# Patient Record
Sex: FEMALE | Race: WHITE | Employment: OTHER | URBAN - METROPOLITAN AREA
[De-identification: names, ages, dates, MRNs, and addresses within clinical notes are randomized per-mention and may not be internally consistent; named-entity substitution may affect disease eponyms.]

---

## 2017-03-13 PROBLEM — I48.91 NEW ONSET ATRIAL FIBRILLATION (HCC): Status: ACTIVE | Noted: 2017-03-13

## 2017-03-13 PROBLEM — I50.43 ACUTE ON CHRONIC COMBINED SYSTOLIC AND DIASTOLIC CHF (CONGESTIVE HEART FAILURE) (HCC): Status: ACTIVE | Noted: 2017-03-13

## 2018-01-01 ENCOUNTER — HOSPITAL ENCOUNTER (OUTPATIENT)
Age: 83
Discharge: HOME | End: 2018-09-16
Payer: MEDICARE

## 2018-01-01 ENCOUNTER — HOSPITAL ENCOUNTER (OUTPATIENT)
Age: 83
Discharge: HOME | End: 2018-12-05
Payer: MEDICARE

## 2018-01-01 ENCOUNTER — TELEPHONE (OUTPATIENT)
Dept: CARDIOLOGY CLINIC | Age: 83
End: 2018-01-01

## 2018-01-01 DIAGNOSIS — E03.8 OTHER SPECIFIED HYPOTHYROIDISM: ICD-10-CM

## 2018-01-01 DIAGNOSIS — I10 BENIGN ESSENTIAL HTN: ICD-10-CM

## 2018-01-01 DIAGNOSIS — E78.5 HYPERLIPIDEMIA, UNSPECIFIED HYPERLIPIDEMIA TYPE: ICD-10-CM

## 2018-01-01 DIAGNOSIS — I48.20 CHRONIC ATRIAL FIBRILLATION (HCC): ICD-10-CM

## 2018-01-01 DIAGNOSIS — E11.9 TYPE 2 DIABETES MELLITUS WITHOUT COMPLICATION, WITHOUT LONG-TERM CURRENT USE OF INSULIN (HCC): ICD-10-CM

## 2018-01-01 LAB
ALBUMIN SERPL-MCNC: 3.6 G/DL (ref 3.5–5.2)
ALBUMIN SERPL-MCNC: 3.8 G/DL (ref 3.5–5.2)
ALP BLD-CCNC: 91 U/L (ref 35–104)
ALP BLD-CCNC: 96 U/L (ref 35–104)
ALT SERPL-CCNC: 21 U/L (ref 0–32)
ALT SERPL-CCNC: 7 U/L (ref 0–32)
ANION GAP SERPL CALCULATED.3IONS-SCNC: 14 MMOL/L (ref 7–16)
ANION GAP SERPL CALCULATED.3IONS-SCNC: 20 MMOL/L (ref 7–16)
AST SERPL-CCNC: 16 U/L (ref 0–31)
AST SERPL-CCNC: 20 U/L (ref 0–31)
BASOPHILS ABSOLUTE: 0.1 E9/L (ref 0–0.2)
BASOPHILS RELATIVE PERCENT: 0.9 % (ref 0–2)
BILIRUB SERPL-MCNC: 0.6 MG/DL (ref 0–1.2)
BILIRUB SERPL-MCNC: 0.6 MG/DL (ref 0–1.2)
BUN BLDV-MCNC: 30 MG/DL (ref 8–23)
BUN BLDV-MCNC: 36 MG/DL (ref 8–23)
CALCIUM SERPL-MCNC: 9.6 MG/DL (ref 8.6–10.2)
CALCIUM SERPL-MCNC: 9.9 MG/DL (ref 8.6–10.2)
CHLORIDE BLD-SCNC: 101 MMOL/L (ref 98–107)
CHLORIDE BLD-SCNC: 101 MMOL/L (ref 98–107)
CHOLESTEROL, TOTAL: 209 MG/DL (ref 0–199)
CO2: 19 MMOL/L (ref 22–29)
CO2: 20 MMOL/L (ref 22–29)
CREAT SERPL-MCNC: 1.5 MG/DL (ref 0.5–1)
CREAT SERPL-MCNC: 1.5 MG/DL (ref 0.5–1)
EOSINOPHILS ABSOLUTE: 0.22 E9/L (ref 0.05–0.5)
EOSINOPHILS RELATIVE PERCENT: 2.1 % (ref 0–6)
GFR AFRICAN AMERICAN: 39
GFR AFRICAN AMERICAN: 39
GFR NON-AFRICAN AMERICAN: 32 ML/MIN/1.73
GFR NON-AFRICAN AMERICAN: 32 ML/MIN/1.73
GLUCOSE BLD-MCNC: 226 MG/DL (ref 74–109)
GLUCOSE BLD-MCNC: 251 MG/DL (ref 74–99)
HBA1C MFR BLD: 7.4 % (ref 4–5.6)
HBA1C MFR BLD: 8.5 % (ref 4–5.6)
HCT VFR BLD CALC: 34.3 % (ref 34–48)
HCT VFR BLD CALC: 36.2 % (ref 34–48)
HDLC SERPL-MCNC: 87 MG/DL
HEMOGLOBIN: 11.1 G/DL (ref 11.5–15.5)
HEMOGLOBIN: 11.5 G/DL (ref 11.5–15.5)
IMMATURE GRANULOCYTES #: 0.06 E9/L
IMMATURE GRANULOCYTES %: 0.6 % (ref 0–5)
LDL CHOLESTEROL CALCULATED: 95 MG/DL (ref 0–99)
LYMPHOCYTES ABSOLUTE: 1.36 E9/L (ref 1.5–4)
LYMPHOCYTES RELATIVE PERCENT: 12.9 % (ref 20–42)
MCH RBC QN AUTO: 29.6 PG (ref 26–35)
MCH RBC QN AUTO: 30.9 PG (ref 26–35)
MCHC RBC AUTO-ENTMCNC: 31.8 % (ref 32–34.5)
MCHC RBC AUTO-ENTMCNC: 32.4 % (ref 32–34.5)
MCV RBC AUTO: 91.5 FL (ref 80–99.9)
MCV RBC AUTO: 97.3 FL (ref 80–99.9)
MONOCYTES ABSOLUTE: 0.78 E9/L (ref 0.1–0.95)
MONOCYTES RELATIVE PERCENT: 7.4 % (ref 2–12)
NEUTROPHILS ABSOLUTE: 8.05 E9/L (ref 1.8–7.3)
NEUTROPHILS RELATIVE PERCENT: 76.1 % (ref 43–80)
PDW BLD-RTO: 14.5 FL (ref 11.5–15)
PDW BLD-RTO: 16 FL (ref 11.5–15)
PLATELET # BLD: 342 E9/L (ref 130–450)
PLATELET # BLD: 375 E9/L (ref 130–450)
PMV BLD AUTO: 10.6 FL (ref 7–12)
PMV BLD AUTO: 12.1 FL (ref 7–12)
POTASSIUM SERPL-SCNC: 4.2 MMOL/L (ref 3.5–5)
POTASSIUM SERPL-SCNC: 4.6 MMOL/L (ref 3.5–5)
RBC # BLD: 3.72 E12/L (ref 3.5–5.5)
RBC # BLD: 3.75 E12/L (ref 3.5–5.5)
SODIUM BLD-SCNC: 135 MMOL/L (ref 132–146)
SODIUM BLD-SCNC: 140 MMOL/L (ref 132–146)
T3 TOTAL: 55.74 NG/DL (ref 80–200)
T3 UPTAKE PERCENT: 34.2 % (ref 22.5–37)
T4 TOTAL: 6.7 MCG/DL (ref 4.5–11.7)
TOTAL PROTEIN: 6.7 G/DL (ref 6.4–8.3)
TOTAL PROTEIN: 7.1 G/DL (ref 6.4–8.3)
TRIGL SERPL-MCNC: 135 MG/DL (ref 0–149)
TSH SERPL DL<=0.05 MIU/L-ACNC: 123.7 UIU/ML (ref 0.27–4.2)
TSH SERPL DL<=0.05 MIU/L-ACNC: 6.31 UIU/ML (ref 0.27–4.2)
VLDLC SERPL CALC-MCNC: 27 MG/DL
WBC # BLD: 10.6 E9/L (ref 4.5–11.5)
WBC # BLD: 9.8 E9/L (ref 4.5–11.5)

## 2018-01-01 PROCEDURE — 84479 ASSAY OF THYROID (T3 OR T4): CPT

## 2018-01-01 PROCEDURE — 84443 ASSAY THYROID STIM HORMONE: CPT

## 2018-01-01 PROCEDURE — 85025 COMPLETE CBC W/AUTO DIFF WBC: CPT

## 2018-01-01 PROCEDURE — 80053 COMPREHEN METABOLIC PANEL: CPT

## 2018-01-01 PROCEDURE — 80061 LIPID PANEL: CPT

## 2018-01-01 PROCEDURE — 83036 HEMOGLOBIN GLYCOSYLATED A1C: CPT

## 2018-01-01 PROCEDURE — 84480 ASSAY TRIIODOTHYRONINE (T3): CPT

## 2018-01-01 PROCEDURE — 84436 ASSAY OF TOTAL THYROXINE: CPT

## 2018-01-01 PROCEDURE — 85027 COMPLETE CBC AUTOMATED: CPT

## 2018-03-02 PROBLEM — S72.141A CLOSED INTERTROCHANTERIC FRACTURE OF RIGHT FEMUR (HCC): Status: ACTIVE | Noted: 2018-03-02

## 2018-03-02 PROBLEM — S72.001A CLOSED RIGHT HIP FRACTURE, INITIAL ENCOUNTER (HCC): Status: ACTIVE | Noted: 2018-03-02

## 2018-03-03 PROBLEM — S72.001A CLOSED RIGHT HIP FRACTURE, INITIAL ENCOUNTER (HCC): Status: RESOLVED | Noted: 2018-03-02 | Resolved: 2018-03-03

## 2018-03-05 PROBLEM — I25.10 CAD (CORONARY ARTERY DISEASE): Status: ACTIVE | Noted: 2018-03-05

## 2018-03-06 PROBLEM — S72.141P CLOSED DISP INTERTROCHANTERIC FRACTURE OF RIGHT FEMUR WITH MALUNION: Status: ACTIVE | Noted: 2018-03-06

## 2018-03-06 PROBLEM — S72.141A CLOSED INTERTROCHANTERIC FRACTURE OF RIGHT FEMUR (HCC): Status: RESOLVED | Noted: 2018-03-02 | Resolved: 2018-03-06

## 2018-05-03 ENCOUNTER — HOSPITAL ENCOUNTER (INPATIENT)
Age: 83
LOS: 1 days | Discharge: HOME HEALTH | DRG: 291 | End: 2018-05-04
Attending: EMERGENCY MEDICINE | Admitting: FAMILY MEDICINE
Payer: MEDICARE

## 2018-05-03 ENCOUNTER — APPOINTMENT (OUTPATIENT)
Dept: GENERAL RADIOLOGY | Age: 83
DRG: 291 | End: 2018-05-03
Payer: MEDICARE

## 2018-05-03 DIAGNOSIS — R06.89 DYSPNEA AND RESPIRATORY ABNORMALITIES: ICD-10-CM

## 2018-05-03 DIAGNOSIS — R06.00 DYSPNEA AND RESPIRATORY ABNORMALITIES: ICD-10-CM

## 2018-05-03 DIAGNOSIS — I50.33 ACUTE ON CHRONIC DIASTOLIC CONGESTIVE HEART FAILURE (HCC): ICD-10-CM

## 2018-05-03 DIAGNOSIS — R09.02 HYPOXIA: Primary | ICD-10-CM

## 2018-05-03 PROBLEM — J96.00 ACUTE RESPIRATORY FAILURE (HCC): Status: ACTIVE | Noted: 2018-05-03

## 2018-05-03 LAB
ALBUMIN SERPL-MCNC: 3.7 G/DL (ref 3.5–5.2)
ALP BLD-CCNC: 103 U/L (ref 35–104)
ALT SERPL-CCNC: 12 U/L (ref 0–32)
ANION GAP SERPL CALCULATED.3IONS-SCNC: 15 MMOL/L (ref 7–16)
AST SERPL-CCNC: 16 U/L (ref 0–31)
BACTERIA: ABNORMAL /HPF
BASOPHILS ABSOLUTE: 0.09 E9/L (ref 0–0.2)
BASOPHILS RELATIVE PERCENT: 1 % (ref 0–2)
BILIRUB SERPL-MCNC: 0.9 MG/DL (ref 0–1.2)
BILIRUBIN URINE: NEGATIVE
BLOOD, URINE: ABNORMAL
BUN BLDV-MCNC: 23 MG/DL (ref 8–23)
CALCIUM SERPL-MCNC: 9.8 MG/DL (ref 8.6–10.2)
CHLORIDE BLD-SCNC: 105 MMOL/L (ref 98–107)
CLARITY: CLEAR
CO2: 19 MMOL/L (ref 22–29)
COLOR: YELLOW
CREAT SERPL-MCNC: 1.3 MG/DL (ref 0.5–1)
EKG ATRIAL RATE: 94 BPM
EKG Q-T INTERVAL: 432 MS
EKG QRS DURATION: 92 MS
EKG QTC CALCULATION (BAZETT): 438 MS
EKG R AXIS: -32 DEGREES
EKG T AXIS: 35 DEGREES
EKG VENTRICULAR RATE: 62 BPM
EOSINOPHILS ABSOLUTE: 0.21 E9/L (ref 0.05–0.5)
EOSINOPHILS RELATIVE PERCENT: 2.4 % (ref 0–6)
FILM ARRAY ADENOVIRUS: NORMAL
FILM ARRAY BORDETELLA PERTUSSIS: NORMAL
FILM ARRAY CHLAMYDOPHILIA PNEUMONIAE: NORMAL
FILM ARRAY CORONAVIRUS 229E: NORMAL
FILM ARRAY CORONAVIRUS HKU1: NORMAL
FILM ARRAY CORONAVIRUS NL63: NORMAL
FILM ARRAY CORONAVIRUS OC43: NORMAL
FILM ARRAY INFLUENZA A VIRUS 09H1: NORMAL
FILM ARRAY INFLUENZA A VIRUS H1: NORMAL
FILM ARRAY INFLUENZA A VIRUS H3: NORMAL
FILM ARRAY INFLUENZA A VIRUS: NORMAL
FILM ARRAY INFLUENZA B: NORMAL
FILM ARRAY METAPNEUMOVIRUS: NORMAL
FILM ARRAY MYCOPLASMA PNEUMONIAE: NORMAL
FILM ARRAY PARAINFLUENZA VIRUS 1: NORMAL
FILM ARRAY PARAINFLUENZA VIRUS 2: NORMAL
FILM ARRAY PARAINFLUENZA VIRUS 3: NORMAL
FILM ARRAY PARAINFLUENZA VIRUS 4: NORMAL
FILM ARRAY RESPIRATORY SYNCITIAL VIRUS: NORMAL
FILM ARRAY RHINOVIRUS/ENTEROVIRUS: NORMAL
GFR AFRICAN AMERICAN: 46
GFR NON-AFRICAN AMERICAN: 38 ML/MIN/1.73
GLUCOSE BLD-MCNC: 144 MG/DL (ref 74–109)
GLUCOSE URINE: NEGATIVE MG/DL
HCT VFR BLD CALC: 33.2 % (ref 34–48)
HEMOGLOBIN: 10.7 G/DL (ref 11.5–15.5)
IMMATURE GRANULOCYTES #: 0.03 E9/L
IMMATURE GRANULOCYTES %: 0.3 % (ref 0–5)
KETONES, URINE: NEGATIVE MG/DL
LACTIC ACID: 1 MMOL/L (ref 0.5–2.2)
LEUKOCYTE ESTERASE, URINE: ABNORMAL
LYMPHOCYTES ABSOLUTE: 1.32 E9/L (ref 1.5–4)
LYMPHOCYTES RELATIVE PERCENT: 15 % (ref 20–42)
MCH RBC QN AUTO: 30.1 PG (ref 26–35)
MCHC RBC AUTO-ENTMCNC: 32.2 % (ref 32–34.5)
MCV RBC AUTO: 93.3 FL (ref 80–99.9)
MONOCYTES ABSOLUTE: 0.92 E9/L (ref 0.1–0.95)
MONOCYTES RELATIVE PERCENT: 10.5 % (ref 2–12)
NEUTROPHILS ABSOLUTE: 6.23 E9/L (ref 1.8–7.3)
NEUTROPHILS RELATIVE PERCENT: 70.8 % (ref 43–80)
NITRITE, URINE: NEGATIVE
PDW BLD-RTO: 14.2 FL (ref 11.5–15)
PH UA: 6 (ref 5–9)
PLATELET # BLD: 441 E9/L (ref 130–450)
PMV BLD AUTO: 9.8 FL (ref 7–12)
POTASSIUM REFLEX MAGNESIUM: 4 MMOL/L (ref 3.5–5)
PRO-BNP: 2238 PG/ML (ref 0–450)
PROTEIN UA: 30 MG/DL
RBC # BLD: 3.56 E12/L (ref 3.5–5.5)
RBC UA: ABNORMAL /HPF (ref 0–2)
SODIUM BLD-SCNC: 139 MMOL/L (ref 132–146)
SPECIFIC GRAVITY UA: 1.02 (ref 1–1.03)
TOTAL PROTEIN: 7.5 G/DL (ref 6.4–8.3)
TROPONIN: 0.03 NG/ML (ref 0–0.03)
UROBILINOGEN, URINE: 0.2 E.U./DL
WBC # BLD: 8.8 E9/L (ref 4.5–11.5)
WBC UA: >20 /HPF (ref 0–5)

## 2018-05-03 PROCEDURE — 87798 DETECT AGENT NOS DNA AMP: CPT

## 2018-05-03 PROCEDURE — 83880 ASSAY OF NATRIURETIC PEPTIDE: CPT

## 2018-05-03 PROCEDURE — G0378 HOSPITAL OBSERVATION PER HR: HCPCS

## 2018-05-03 PROCEDURE — 87486 CHLMYD PNEUM DNA AMP PROBE: CPT

## 2018-05-03 PROCEDURE — 1200000000 HC SEMI PRIVATE

## 2018-05-03 PROCEDURE — 99223 1ST HOSP IP/OBS HIGH 75: CPT | Performed by: INTERNAL MEDICINE

## 2018-05-03 PROCEDURE — G8988 SELF CARE GOAL STATUS: HCPCS

## 2018-05-03 PROCEDURE — 93005 ELECTROCARDIOGRAM TRACING: CPT | Performed by: FAMILY MEDICINE

## 2018-05-03 PROCEDURE — 2580000003 HC RX 258: Performed by: FAMILY MEDICINE

## 2018-05-03 PROCEDURE — 87581 M.PNEUMON DNA AMP PROBE: CPT

## 2018-05-03 PROCEDURE — 84484 ASSAY OF TROPONIN QUANT: CPT

## 2018-05-03 PROCEDURE — 97165 OT EVAL LOW COMPLEX 30 MIN: CPT

## 2018-05-03 PROCEDURE — 81001 URINALYSIS AUTO W/SCOPE: CPT

## 2018-05-03 PROCEDURE — 6370000000 HC RX 637 (ALT 250 FOR IP): Performed by: FAMILY MEDICINE

## 2018-05-03 PROCEDURE — 99285 EMERGENCY DEPT VISIT HI MDM: CPT

## 2018-05-03 PROCEDURE — 87040 BLOOD CULTURE FOR BACTERIA: CPT

## 2018-05-03 PROCEDURE — 87501 INFLUENZA DNA AMP PROB 1+: CPT

## 2018-05-03 PROCEDURE — 80053 COMPREHEN METABOLIC PANEL: CPT

## 2018-05-03 PROCEDURE — 87088 URINE BACTERIA CULTURE: CPT

## 2018-05-03 PROCEDURE — 85025 COMPLETE CBC W/AUTO DIFF WBC: CPT

## 2018-05-03 PROCEDURE — G8987 SELF CARE CURRENT STATUS: HCPCS

## 2018-05-03 PROCEDURE — 87502 INFLUENZA DNA AMP PROBE: CPT

## 2018-05-03 PROCEDURE — 87503 INFLUENZA DNA AMP PROB ADDL: CPT

## 2018-05-03 PROCEDURE — 83605 ASSAY OF LACTIC ACID: CPT

## 2018-05-03 PROCEDURE — 6360000002 HC RX W HCPCS: Performed by: NURSE PRACTITIONER

## 2018-05-03 PROCEDURE — 96374 THER/PROPH/DIAG INJ IV PUSH: CPT

## 2018-05-03 PROCEDURE — 71045 X-RAY EXAM CHEST 1 VIEW: CPT

## 2018-05-03 PROCEDURE — 97161 PT EVAL LOW COMPLEX 20 MIN: CPT

## 2018-05-03 RX ORDER — ISOSORBIDE MONONITRATE 60 MG/1
60 TABLET, EXTENDED RELEASE ORAL DAILY
Status: DISCONTINUED | OUTPATIENT
Start: 2018-05-03 | End: 2018-05-04 | Stop reason: HOSPADM

## 2018-05-03 RX ORDER — TRAMADOL HYDROCHLORIDE 50 MG/1
50 TABLET ORAL EVERY 6 HOURS PRN
COMMUNITY

## 2018-05-03 RX ORDER — FERROUS SULFATE 325(65) MG
325 TABLET ORAL 2 TIMES DAILY
Status: DISCONTINUED | OUTPATIENT
Start: 2018-05-03 | End: 2018-05-04 | Stop reason: HOSPADM

## 2018-05-03 RX ORDER — TRAZODONE HYDROCHLORIDE 50 MG/1
50 TABLET ORAL NIGHTLY
Status: DISCONTINUED | OUTPATIENT
Start: 2018-05-03 | End: 2018-05-04 | Stop reason: HOSPADM

## 2018-05-03 RX ORDER — FUROSEMIDE 40 MG/1
40 TABLET ORAL DAILY
Status: ON HOLD | COMMUNITY
End: 2018-05-04 | Stop reason: HOSPADM

## 2018-05-03 RX ORDER — FUROSEMIDE 10 MG/ML
40 INJECTION INTRAMUSCULAR; INTRAVENOUS ONCE
Status: COMPLETED | OUTPATIENT
Start: 2018-05-03 | End: 2018-05-03

## 2018-05-03 RX ORDER — TRAMADOL HYDROCHLORIDE 50 MG/1
50 TABLET ORAL EVERY 6 HOURS PRN
Status: DISCONTINUED | OUTPATIENT
Start: 2018-05-03 | End: 2018-05-04 | Stop reason: HOSPADM

## 2018-05-03 RX ORDER — LEVOTHYROXINE SODIUM 0.12 MG/1
125 TABLET ORAL DAILY
Status: DISCONTINUED | OUTPATIENT
Start: 2018-05-04 | End: 2018-05-04 | Stop reason: HOSPADM

## 2018-05-03 RX ORDER — FUROSEMIDE 40 MG/1
40 TABLET ORAL DAILY
Status: DISCONTINUED | OUTPATIENT
Start: 2018-05-04 | End: 2018-05-04 | Stop reason: HOSPADM

## 2018-05-03 RX ORDER — M-VIT,TX,IRON,MINS/CALC/FOLIC 27MG-0.4MG
1 TABLET ORAL DAILY
Status: DISCONTINUED | OUTPATIENT
Start: 2018-05-03 | End: 2018-05-04 | Stop reason: HOSPADM

## 2018-05-03 RX ORDER — TRAMADOL HYDROCHLORIDE 50 MG/1
50 TABLET ORAL ONCE
Status: COMPLETED | OUTPATIENT
Start: 2018-05-03 | End: 2018-05-03

## 2018-05-03 RX ORDER — SODIUM CHLORIDE 0.9 % (FLUSH) 0.9 %
10 SYRINGE (ML) INJECTION EVERY 12 HOURS SCHEDULED
Status: DISCONTINUED | OUTPATIENT
Start: 2018-05-03 | End: 2018-05-04 | Stop reason: HOSPADM

## 2018-05-03 RX ORDER — TRAZODONE HYDROCHLORIDE 50 MG/1
100 TABLET ORAL NIGHTLY
Status: ON HOLD | COMMUNITY
End: 2019-01-01 | Stop reason: HOSPADM

## 2018-05-03 RX ORDER — FUROSEMIDE 40 MG/1
40 TABLET ORAL DAILY
Status: DISCONTINUED | OUTPATIENT
Start: 2018-05-03 | End: 2018-05-03

## 2018-05-03 RX ORDER — ACETAMINOPHEN 325 MG/1
650 TABLET ORAL EVERY 4 HOURS PRN
Status: DISCONTINUED | OUTPATIENT
Start: 2018-05-03 | End: 2018-05-04 | Stop reason: HOSPADM

## 2018-05-03 RX ORDER — SODIUM CHLORIDE 0.9 % (FLUSH) 0.9 %
10 SYRINGE (ML) INJECTION PRN
Status: DISCONTINUED | OUTPATIENT
Start: 2018-05-03 | End: 2018-05-04 | Stop reason: HOSPADM

## 2018-05-03 RX ORDER — AMLODIPINE BESYLATE 5 MG/1
5 TABLET ORAL 2 TIMES DAILY
Status: DISCONTINUED | OUTPATIENT
Start: 2018-05-03 | End: 2018-05-04 | Stop reason: HOSPADM

## 2018-05-03 RX ORDER — LEVOTHYROXINE SODIUM 0.12 MG/1
125 TABLET ORAL ONCE
Status: COMPLETED | OUTPATIENT
Start: 2018-05-03 | End: 2018-05-03

## 2018-05-03 RX ADMIN — FERROUS SULFATE TAB 325 MG (65 MG ELEMENTAL FE) 325 MG: 325 (65 FE) TAB at 20:24

## 2018-05-03 RX ADMIN — Medication 10 ML: at 20:24

## 2018-05-03 RX ADMIN — TRAMADOL HYDROCHLORIDE 50 MG: 50 TABLET, FILM COATED ORAL at 07:39

## 2018-05-03 RX ADMIN — Medication 10 ML: at 09:03

## 2018-05-03 RX ADMIN — TRAZODONE HYDROCHLORIDE 50 MG: 50 TABLET ORAL at 20:24

## 2018-05-03 RX ADMIN — Medication 1 TABLET: at 15:15

## 2018-05-03 RX ADMIN — Medication 10 ML: at 15:15

## 2018-05-03 RX ADMIN — APIXABAN 2.5 MG: 2.5 TABLET, FILM COATED ORAL at 20:24

## 2018-05-03 RX ADMIN — AMLODIPINE BESYLATE 5 MG: 5 TABLET ORAL at 20:24

## 2018-05-03 RX ADMIN — ISOSORBIDE MONONITRATE 60 MG: 60 TABLET, EXTENDED RELEASE ORAL at 15:15

## 2018-05-03 RX ADMIN — LEVOTHYROXINE SODIUM 125 MCG: 125 TABLET ORAL at 07:52

## 2018-05-03 RX ADMIN — FUROSEMIDE 40 MG: 10 INJECTION, SOLUTION INTRAMUSCULAR; INTRAVENOUS at 15:15

## 2018-05-03 ASSESSMENT — ENCOUNTER SYMPTOMS
SHORTNESS OF BREATH: 0
EYE DISCHARGE: 0
ABDOMINAL DISTENTION: 0
EYE PAIN: 0
SINUS PRESSURE: 0
BACK PAIN: 0
SORE THROAT: 0
WHEEZING: 0
NAUSEA: 0
COUGH: 0
EYE REDNESS: 0
VOMITING: 0
DIARRHEA: 0

## 2018-05-03 ASSESSMENT — PAIN SCALES - GENERAL
PAINLEVEL_OUTOF10: 6
PAINLEVEL_OUTOF10: 4
PAINLEVEL_OUTOF10: 0
PAINLEVEL_OUTOF10: 0

## 2018-05-04 VITALS
BODY MASS INDEX: 24.81 KG/M2 | HEART RATE: 63 BPM | TEMPERATURE: 97.7 F | HEIGHT: 60 IN | OXYGEN SATURATION: 93 % | DIASTOLIC BLOOD PRESSURE: 71 MMHG | WEIGHT: 126.38 LBS | RESPIRATION RATE: 19 BRPM | SYSTOLIC BLOOD PRESSURE: 159 MMHG

## 2018-05-04 LAB
ANION GAP SERPL CALCULATED.3IONS-SCNC: 14 MMOL/L (ref 7–16)
BUN BLDV-MCNC: 21 MG/DL (ref 8–23)
CALCIUM SERPL-MCNC: 9.5 MG/DL (ref 8.6–10.2)
CHLORIDE BLD-SCNC: 101 MMOL/L (ref 98–107)
CO2: 23 MMOL/L (ref 22–29)
CREAT SERPL-MCNC: 1.2 MG/DL (ref 0.5–1)
GFR AFRICAN AMERICAN: 51
GFR NON-AFRICAN AMERICAN: 42 ML/MIN/1.73
GLUCOSE BLD-MCNC: 105 MG/DL (ref 74–109)
POTASSIUM REFLEX MAGNESIUM: 3.9 MMOL/L (ref 3.5–5)
SODIUM BLD-SCNC: 138 MMOL/L (ref 132–146)
T4 FREE: 1.96 NG/DL (ref 0.93–1.7)
TSH SERPL DL<=0.05 MIU/L-ACNC: 0.74 UIU/ML (ref 0.27–4.2)

## 2018-05-04 PROCEDURE — 36415 COLL VENOUS BLD VENIPUNCTURE: CPT

## 2018-05-04 PROCEDURE — 6370000000 HC RX 637 (ALT 250 FOR IP): Performed by: FAMILY MEDICINE

## 2018-05-04 PROCEDURE — 84443 ASSAY THYROID STIM HORMONE: CPT

## 2018-05-04 PROCEDURE — 97530 THERAPEUTIC ACTIVITIES: CPT

## 2018-05-04 PROCEDURE — G0378 HOSPITAL OBSERVATION PER HR: HCPCS

## 2018-05-04 PROCEDURE — 99999 PR OFFICE/OUTPT VISIT,PROCEDURE ONLY: CPT | Performed by: NURSE PRACTITIONER

## 2018-05-04 PROCEDURE — 80048 BASIC METABOLIC PNL TOTAL CA: CPT

## 2018-05-04 PROCEDURE — 2580000003 HC RX 258: Performed by: FAMILY MEDICINE

## 2018-05-04 PROCEDURE — 84439 ASSAY OF FREE THYROXINE: CPT

## 2018-05-04 PROCEDURE — 6370000000 HC RX 637 (ALT 250 FOR IP): Performed by: NURSE PRACTITIONER

## 2018-05-04 RX ORDER — FUROSEMIDE 40 MG/1
40 TABLET ORAL DAILY
Qty: 60 TABLET | Refills: 3 | Status: SHIPPED | OUTPATIENT
Start: 2018-05-05 | End: 2018-01-01

## 2018-05-04 RX ADMIN — AMLODIPINE BESYLATE 5 MG: 5 TABLET ORAL at 08:39

## 2018-05-04 RX ADMIN — APIXABAN 2.5 MG: 2.5 TABLET, FILM COATED ORAL at 08:39

## 2018-05-04 RX ADMIN — LEVOTHYROXINE SODIUM 125 MCG: 125 TABLET ORAL at 05:11

## 2018-05-04 RX ADMIN — FUROSEMIDE 40 MG: 40 TABLET ORAL at 08:39

## 2018-05-04 RX ADMIN — ISOSORBIDE MONONITRATE 60 MG: 60 TABLET, EXTENDED RELEASE ORAL at 08:39

## 2018-05-04 RX ADMIN — FERROUS SULFATE TAB 325 MG (65 MG ELEMENTAL FE) 325 MG: 325 (65 FE) TAB at 08:38

## 2018-05-04 RX ADMIN — Medication 1 TABLET: at 08:38

## 2018-05-04 RX ADMIN — Medication 10 ML: at 08:38

## 2018-05-04 ASSESSMENT — PAIN SCALES - GENERAL: PAINLEVEL_OUTOF10: 0

## 2018-05-05 LAB
ORGANISM: ABNORMAL
URINE CULTURE, ROUTINE: ABNORMAL
URINE CULTURE, ROUTINE: ABNORMAL

## 2018-05-08 LAB
BLOOD CULTURE, ROUTINE: NORMAL
CULTURE, BLOOD 2: NORMAL

## 2018-06-13 ENCOUNTER — OFFICE VISIT (OUTPATIENT)
Dept: CARDIOLOGY CLINIC | Age: 83
End: 2018-06-13
Payer: MEDICARE

## 2018-06-13 VITALS
HEIGHT: 59 IN | DIASTOLIC BLOOD PRESSURE: 78 MMHG | WEIGHT: 123.8 LBS | RESPIRATION RATE: 20 BRPM | HEART RATE: 51 BPM | BODY MASS INDEX: 24.96 KG/M2 | SYSTOLIC BLOOD PRESSURE: 132 MMHG

## 2018-06-13 DIAGNOSIS — I48.91 NEW ONSET ATRIAL FIBRILLATION (HCC): Primary | ICD-10-CM

## 2018-06-13 PROCEDURE — 93000 ELECTROCARDIOGRAM COMPLETE: CPT | Performed by: INTERNAL MEDICINE

## 2018-06-13 PROCEDURE — 99213 OFFICE O/P EST LOW 20 MIN: CPT | Performed by: INTERNAL MEDICINE

## 2018-12-03 PROBLEM — Z79.01 CHRONIC ANTICOAGULATION: Status: ACTIVE | Noted: 2018-01-01

## 2018-12-03 PROBLEM — E11.9 TYPE 2 DIABETES MELLITUS WITHOUT COMPLICATION, WITHOUT LONG-TERM CURRENT USE OF INSULIN (HCC): Status: ACTIVE | Noted: 2018-01-01

## 2018-12-03 PROBLEM — N18.31 CHRONIC KIDNEY DISEASE (CKD) STAGE G3A/A1, MODERATELY DECREASED GLOMERULAR FILTRATION RATE (GFR) BETWEEN 45-59 ML/MIN/1.73 SQUARE METER AND ALBUMINURIA CREATININE RATIO LESS THAN 30 MG/G (HCC): Status: ACTIVE | Noted: 2018-01-01

## 2018-12-03 PROBLEM — I48.20 CHRONIC ATRIAL FIBRILLATION (HCC): Status: ACTIVE | Noted: 2018-01-01

## 2018-12-03 PROBLEM — F51.01 PRIMARY INSOMNIA: Status: ACTIVE | Noted: 2018-01-01

## 2018-12-03 PROBLEM — E03.8 OTHER SPECIFIED HYPOTHYROIDISM: Status: ACTIVE | Noted: 2018-01-01

## 2018-12-03 PROBLEM — I10 BENIGN ESSENTIAL HTN: Status: ACTIVE | Noted: 2018-01-01

## 2019-01-01 ENCOUNTER — HOSPITAL ENCOUNTER (INPATIENT)
Age: 84
LOS: 4 days | Discharge: NURSING, REHAB OR LONG-TERM CARE | DRG: 291 | End: 2019-06-14
Attending: EMERGENCY MEDICINE | Admitting: FAMILY MEDICINE
Payer: MEDICARE

## 2019-01-01 ENCOUNTER — APPOINTMENT (OUTPATIENT)
Dept: GENERAL RADIOLOGY | Age: 84
DRG: 291 | End: 2019-01-01
Payer: MEDICARE

## 2019-01-01 ENCOUNTER — TELEPHONE (OUTPATIENT)
Dept: CARDIOLOGY CLINIC | Age: 84
End: 2019-01-01

## 2019-01-01 ENCOUNTER — HOSPITAL ENCOUNTER (EMERGENCY)
Age: 84
Discharge: OTHER INSTITUTION | End: 2019-05-30
Attending: EMERGENCY MEDICINE
Payer: MEDICARE

## 2019-01-01 ENCOUNTER — HOSPITAL ENCOUNTER (OUTPATIENT)
Age: 84
Discharge: HOME | End: 2019-03-06
Payer: MEDICARE

## 2019-01-01 ENCOUNTER — APPOINTMENT (OUTPATIENT)
Dept: GENERAL RADIOLOGY | Age: 84
DRG: 378 | End: 2019-01-01
Payer: MEDICARE

## 2019-01-01 ENCOUNTER — APPOINTMENT (OUTPATIENT)
Dept: CT IMAGING | Age: 84
End: 2019-01-01
Payer: MEDICARE

## 2019-01-01 ENCOUNTER — HOSPITAL ENCOUNTER (EMERGENCY)
Age: 84
Discharge: NURSING, REHAB OR LONG-TERM CARE | End: 2019-07-10
Attending: EMERGENCY MEDICINE
Payer: MEDICARE

## 2019-01-01 ENCOUNTER — OFFICE VISIT (OUTPATIENT)
Dept: CARDIOLOGY CLINIC | Age: 84
End: 2019-01-01
Payer: MEDICARE

## 2019-01-01 ENCOUNTER — ANESTHESIA (OUTPATIENT)
Dept: OPERATING ROOM | Age: 84
DRG: 378 | End: 2019-01-01
Payer: MEDICARE

## 2019-01-01 ENCOUNTER — APPOINTMENT (OUTPATIENT)
Dept: GENERAL RADIOLOGY | Age: 84
End: 2019-01-01
Payer: MEDICARE

## 2019-01-01 ENCOUNTER — ANESTHESIA EVENT (OUTPATIENT)
Dept: OPERATING ROOM | Age: 84
DRG: 378 | End: 2019-01-01
Payer: MEDICARE

## 2019-01-01 ENCOUNTER — HOSPITAL ENCOUNTER (INPATIENT)
Age: 84
LOS: 5 days | Discharge: NURSING, REHAB OR LONG-TERM CARE | DRG: 378 | End: 2019-05-22
Attending: EMERGENCY MEDICINE | Admitting: INTERNAL MEDICINE
Payer: MEDICARE

## 2019-01-01 VITALS
HEART RATE: 63 BPM | RESPIRATION RATE: 16 BRPM | OXYGEN SATURATION: 100 % | TEMPERATURE: 97.7 F | SYSTOLIC BLOOD PRESSURE: 181 MMHG | DIASTOLIC BLOOD PRESSURE: 67 MMHG

## 2019-01-01 VITALS
OXYGEN SATURATION: 93 % | SYSTOLIC BLOOD PRESSURE: 141 MMHG | TEMPERATURE: 97.5 F | BODY MASS INDEX: 27.68 KG/M2 | DIASTOLIC BLOOD PRESSURE: 68 MMHG | RESPIRATION RATE: 16 BRPM | WEIGHT: 141 LBS | HEIGHT: 60 IN | HEART RATE: 58 BPM

## 2019-01-01 VITALS
DIASTOLIC BLOOD PRESSURE: 58 MMHG | HEIGHT: 60 IN | HEART RATE: 67 BPM | SYSTOLIC BLOOD PRESSURE: 102 MMHG | RESPIRATION RATE: 12 BRPM | WEIGHT: 122 LBS | BODY MASS INDEX: 23.95 KG/M2

## 2019-01-01 VITALS
HEART RATE: 65 BPM | RESPIRATION RATE: 22 BRPM | TEMPERATURE: 98 F | SYSTOLIC BLOOD PRESSURE: 148 MMHG | BODY MASS INDEX: 26.42 KG/M2 | DIASTOLIC BLOOD PRESSURE: 70 MMHG | WEIGHT: 117.44 LBS | OXYGEN SATURATION: 96 % | HEIGHT: 56 IN

## 2019-01-01 VITALS
RESPIRATION RATE: 16 BRPM | DIASTOLIC BLOOD PRESSURE: 74 MMHG | WEIGHT: 129 LBS | BODY MASS INDEX: 22.02 KG/M2 | HEIGHT: 64 IN | HEART RATE: 63 BPM | SYSTOLIC BLOOD PRESSURE: 164 MMHG | TEMPERATURE: 97.7 F | OXYGEN SATURATION: 94 %

## 2019-01-01 VITALS — DIASTOLIC BLOOD PRESSURE: 54 MMHG | SYSTOLIC BLOOD PRESSURE: 118 MMHG | OXYGEN SATURATION: 97 %

## 2019-01-01 DIAGNOSIS — R07.9 CHEST PAIN, UNSPECIFIED TYPE: Primary | ICD-10-CM

## 2019-01-01 DIAGNOSIS — Z66 DNR (DO NOT RESUSCITATE): ICD-10-CM

## 2019-01-01 DIAGNOSIS — R77.8 ELEVATED TROPONIN: ICD-10-CM

## 2019-01-01 DIAGNOSIS — Z79.01 ANTICOAGULATED: ICD-10-CM

## 2019-01-01 DIAGNOSIS — D64.9 ANEMIA, UNSPECIFIED TYPE: ICD-10-CM

## 2019-01-01 DIAGNOSIS — R53.83 OTHER FATIGUE: ICD-10-CM

## 2019-01-01 DIAGNOSIS — I50.23 ACUTE ON CHRONIC SYSTOLIC CONGESTIVE HEART FAILURE (HCC): ICD-10-CM

## 2019-01-01 DIAGNOSIS — S00.81XA ABRASION OF FACE, INITIAL ENCOUNTER: ICD-10-CM

## 2019-01-01 DIAGNOSIS — E87.20 LACTIC ACIDOSIS: ICD-10-CM

## 2019-01-01 DIAGNOSIS — E11.9 TYPE 2 DIABETES MELLITUS WITHOUT COMPLICATION, WITHOUT LONG-TERM CURRENT USE OF INSULIN (HCC): ICD-10-CM

## 2019-01-01 DIAGNOSIS — I50.9 CONGESTIVE HEART FAILURE, UNSPECIFIED HF CHRONICITY, UNSPECIFIED HEART FAILURE TYPE (HCC): Primary | ICD-10-CM

## 2019-01-01 DIAGNOSIS — S09.90XA CLOSED HEAD INJURY, INITIAL ENCOUNTER: Primary | ICD-10-CM

## 2019-01-01 DIAGNOSIS — K92.2 GASTROINTESTINAL HEMORRHAGE, UNSPECIFIED GASTROINTESTINAL HEMORRHAGE TYPE: Primary | ICD-10-CM

## 2019-01-01 DIAGNOSIS — J96.01 ACUTE RESPIRATORY FAILURE WITH HYPOXIA (HCC): ICD-10-CM

## 2019-01-01 DIAGNOSIS — I50.20 SYSTOLIC CONGESTIVE HEART FAILURE, UNSPECIFIED HF CHRONICITY (HCC): Primary | ICD-10-CM

## 2019-01-01 DIAGNOSIS — I48.91 NEW ONSET ATRIAL FIBRILLATION (HCC): Primary | ICD-10-CM

## 2019-01-01 DIAGNOSIS — I10 ESSENTIAL HYPERTENSION: ICD-10-CM

## 2019-01-01 DIAGNOSIS — E03.9 ACQUIRED HYPOTHYROIDISM: ICD-10-CM

## 2019-01-01 LAB
ABO/RH: NORMAL
ALBUMIN SERPL-MCNC: 2.4 G/DL (ref 3.5–5.2)
ALBUMIN SERPL-MCNC: 3.1 G/DL (ref 3.5–5.2)
ALBUMIN SERPL-MCNC: 3.9 G/DL (ref 3.5–5.2)
ALP BLD-CCNC: 126 U/L (ref 35–104)
ALP BLD-CCNC: 127 U/L (ref 35–104)
ALP BLD-CCNC: 142 U/L (ref 35–104)
ALT SERPL-CCNC: 12 U/L (ref 0–32)
ALT SERPL-CCNC: 18 U/L (ref 0–32)
ALT SERPL-CCNC: 8 U/L (ref 0–32)
ANION GAP SERPL CALCULATED.3IONS-SCNC: 10 MMOL/L (ref 7–16)
ANION GAP SERPL CALCULATED.3IONS-SCNC: 11 MMOL/L (ref 7–16)
ANION GAP SERPL CALCULATED.3IONS-SCNC: 14 MMOL/L (ref 7–16)
ANION GAP SERPL CALCULATED.3IONS-SCNC: 15 MMOL/L (ref 7–16)
ANION GAP SERPL CALCULATED.3IONS-SCNC: 17 MMOL/L (ref 7–16)
ANION GAP SERPL CALCULATED.3IONS-SCNC: 23 MMOL/L (ref 7–16)
ANTIBODY IDENTIFICATION: NORMAL
ANTIBODY SCREEN: NORMAL
AST SERPL-CCNC: 12 U/L (ref 0–31)
AST SERPL-CCNC: 18 U/L (ref 0–31)
AST SERPL-CCNC: 26 U/L (ref 0–31)
BACTERIA: ABNORMAL /HPF
BASOPHILS ABSOLUTE: 0 E9/L (ref 0–0.2)
BASOPHILS ABSOLUTE: 0.05 E9/L (ref 0–0.2)
BASOPHILS ABSOLUTE: 0.06 E9/L (ref 0–0.2)
BASOPHILS ABSOLUTE: 0.07 E9/L (ref 0–0.2)
BASOPHILS ABSOLUTE: 0.09 E9/L (ref 0–0.2)
BASOPHILS RELATIVE PERCENT: 0 % (ref 0–2)
BASOPHILS RELATIVE PERCENT: 0.4 % (ref 0–2)
BASOPHILS RELATIVE PERCENT: 0.5 % (ref 0–2)
BASOPHILS RELATIVE PERCENT: 0.5 % (ref 0–2)
BASOPHILS RELATIVE PERCENT: 1.2 % (ref 0–2)
BILIRUB SERPL-MCNC: 0.3 MG/DL (ref 0–1.2)
BILIRUB SERPL-MCNC: 0.6 MG/DL (ref 0–1.2)
BILIRUB SERPL-MCNC: 0.7 MG/DL (ref 0–1.2)
BILIRUBIN URINE: NEGATIVE
BLOOD BANK DISPENSE STATUS: NORMAL
BLOOD BANK DISPENSE STATUS: NORMAL
BLOOD BANK PRODUCT CODE: NORMAL
BLOOD BANK PRODUCT CODE: NORMAL
BLOOD CULTURE, ROUTINE: NORMAL
BLOOD, URINE: ABNORMAL
BPU ID: NORMAL
BPU ID: NORMAL
BUN BLDV-MCNC: 24 MG/DL (ref 8–23)
BUN BLDV-MCNC: 24 MG/DL (ref 8–23)
BUN BLDV-MCNC: 25 MG/DL (ref 8–23)
BUN BLDV-MCNC: 26 MG/DL (ref 8–23)
BUN BLDV-MCNC: 27 MG/DL (ref 8–23)
BUN BLDV-MCNC: 28 MG/DL (ref 8–23)
BUN BLDV-MCNC: 28 MG/DL (ref 8–23)
BUN BLDV-MCNC: 84 MG/DL (ref 8–23)
CALCIUM SERPL-MCNC: 10.7 MG/DL (ref 8.6–10.2)
CALCIUM SERPL-MCNC: 8.5 MG/DL (ref 8.6–10.2)
CALCIUM SERPL-MCNC: 8.7 MG/DL (ref 8.6–10.2)
CALCIUM SERPL-MCNC: 8.9 MG/DL (ref 8.6–10.2)
CALCIUM SERPL-MCNC: 9.1 MG/DL (ref 8.6–10.2)
CALCIUM SERPL-MCNC: 9.2 MG/DL (ref 8.6–10.2)
CALCIUM SERPL-MCNC: 9.2 MG/DL (ref 8.6–10.2)
CALCIUM SERPL-MCNC: 9.5 MG/DL (ref 8.6–10.2)
CHLORIDE BLD-SCNC: 100 MMOL/L (ref 98–107)
CHLORIDE BLD-SCNC: 100 MMOL/L (ref 98–107)
CHLORIDE BLD-SCNC: 101 MMOL/L (ref 98–107)
CHLORIDE BLD-SCNC: 102 MMOL/L (ref 98–107)
CHLORIDE BLD-SCNC: 103 MMOL/L (ref 98–107)
CHLORIDE BLD-SCNC: 94 MMOL/L (ref 98–107)
CHLORIDE BLD-SCNC: 95 MMOL/L (ref 98–107)
CHLORIDE BLD-SCNC: 97 MMOL/L (ref 98–107)
CHOLESTEROL, TOTAL: 159 MG/DL (ref 0–199)
CLARITY: CLEAR
CO2: 15 MMOL/L (ref 22–29)
CO2: 19 MMOL/L (ref 22–29)
CO2: 20 MMOL/L (ref 22–29)
CO2: 25 MMOL/L (ref 22–29)
CO2: 26 MMOL/L (ref 22–29)
CO2: 26 MMOL/L (ref 22–29)
CO2: 28 MMOL/L (ref 22–29)
CO2: 28 MMOL/L (ref 22–29)
COLOR: YELLOW
CREAT SERPL-MCNC: 1.2 MG/DL (ref 0.5–1)
CREAT SERPL-MCNC: 1.3 MG/DL (ref 0.5–1)
CREAT SERPL-MCNC: 1.3 MG/DL (ref 0.5–1)
CREAT SERPL-MCNC: 1.4 MG/DL (ref 0.5–1)
CREAT SERPL-MCNC: 1.5 MG/DL (ref 0.5–1)
CULTURE, BLOOD 2: NORMAL
DAT POLYSPECIFIC: NORMAL
DESCRIPTION BLOOD BANK: NORMAL
DESCRIPTION BLOOD BANK: NORMAL
DR. NOTIFY: NORMAL
EKG ATRIAL RATE: 45 BPM
EKG ATRIAL RATE: 48 BPM
EKG ATRIAL RATE: 60 BPM
EKG Q-T INTERVAL: 312 MS
EKG Q-T INTERVAL: 396 MS
EKG Q-T INTERVAL: 428 MS
EKG QRS DURATION: 68 MS
EKG QRS DURATION: 78 MS
EKG QRS DURATION: 88 MS
EKG QTC CALCULATION (BAZETT): 353 MS
EKG QTC CALCULATION (BAZETT): 398 MS
EKG QTC CALCULATION (BAZETT): 401 MS
EKG R AXIS: -13 DEGREES
EKG R AXIS: -23 DEGREES
EKG R AXIS: 25 DEGREES
EKG T AXIS: -112 DEGREES
EKG T AXIS: -164 DEGREES
EKG T AXIS: -38 DEGREES
EKG VENTRICULAR RATE: 53 BPM
EKG VENTRICULAR RATE: 61 BPM
EKG VENTRICULAR RATE: 77 BPM
EOSINOPHILS ABSOLUTE: 0 E9/L (ref 0.05–0.5)
EOSINOPHILS ABSOLUTE: 0.12 E9/L (ref 0.05–0.5)
EOSINOPHILS ABSOLUTE: 0.12 E9/L (ref 0.05–0.5)
EOSINOPHILS ABSOLUTE: 0.13 E9/L (ref 0.05–0.5)
EOSINOPHILS ABSOLUTE: 0.16 E9/L (ref 0.05–0.5)
EOSINOPHILS RELATIVE PERCENT: 0 % (ref 0–6)
EOSINOPHILS RELATIVE PERCENT: 0.9 % (ref 0–6)
EOSINOPHILS RELATIVE PERCENT: 1.2 % (ref 0–6)
EOSINOPHILS RELATIVE PERCENT: 1.2 % (ref 0–6)
EOSINOPHILS RELATIVE PERCENT: 1.6 % (ref 0–6)
GFR AFRICAN AMERICAN: 39
GFR AFRICAN AMERICAN: 42
GFR AFRICAN AMERICAN: 46
GFR AFRICAN AMERICAN: 46
GFR AFRICAN AMERICAN: 51
GFR NON-AFRICAN AMERICAN: 32 ML/MIN/1.73
GFR NON-AFRICAN AMERICAN: 35 ML/MIN/1.73
GFR NON-AFRICAN AMERICAN: 38 ML/MIN/1.73
GFR NON-AFRICAN AMERICAN: 38 ML/MIN/1.73
GFR NON-AFRICAN AMERICAN: 42 ML/MIN/1.73
GLUCOSE BLD-MCNC: 100 MG/DL (ref 74–99)
GLUCOSE BLD-MCNC: 120 MG/DL (ref 74–99)
GLUCOSE BLD-MCNC: 144 MG/DL (ref 74–99)
GLUCOSE BLD-MCNC: 153 MG/DL (ref 74–99)
GLUCOSE BLD-MCNC: 217 MG/DL (ref 74–99)
GLUCOSE BLD-MCNC: 276 MG/DL (ref 74–99)
GLUCOSE BLD-MCNC: 290 MG/DL (ref 74–99)
GLUCOSE BLD-MCNC: 410 MG/DL (ref 74–99)
GLUCOSE URINE: NEGATIVE MG/DL
HBA1C MFR BLD: 10.2 % (ref 4–5.6)
HCT VFR BLD CALC: 17.1 % (ref 34–48)
HCT VFR BLD CALC: 22.4 % (ref 34–48)
HCT VFR BLD CALC: 28.7 % (ref 34–48)
HCT VFR BLD CALC: 29.8 % (ref 34–48)
HCT VFR BLD CALC: 30.1 % (ref 34–48)
HCT VFR BLD CALC: 30.1 % (ref 34–48)
HCT VFR BLD CALC: 32.2 % (ref 34–48)
HCT VFR BLD CALC: 33.4 % (ref 34–48)
HCT VFR BLD CALC: 36.4 % (ref 34–48)
HDLC SERPL-MCNC: 63 MG/DL
HEMOGLOBIN: 10.2 G/DL (ref 11.5–15.5)
HEMOGLOBIN: 10.5 G/DL (ref 11.5–15.5)
HEMOGLOBIN: 11.7 G/DL (ref 11.5–15.5)
HEMOGLOBIN: 5.3 G/DL (ref 11.5–15.5)
HEMOGLOBIN: 7.1 G/DL (ref 11.5–15.5)
HEMOGLOBIN: 9.2 G/DL (ref 11.5–15.5)
HEMOGLOBIN: 9.6 G/DL (ref 11.5–15.5)
HEMOGLOBIN: 9.6 G/DL (ref 11.5–15.5)
HEMOGLOBIN: 9.9 G/DL (ref 11.5–15.5)
HYPOCHROMIA: ABNORMAL
IMMATURE GRANULOCYTES #: 0.03 E9/L
IMMATURE GRANULOCYTES #: 0.05 E9/L
IMMATURE GRANULOCYTES #: 0.07 E9/L
IMMATURE GRANULOCYTES #: 0.1 E9/L
IMMATURE GRANULOCYTES %: 0.4 % (ref 0–5)
IMMATURE GRANULOCYTES %: 0.4 % (ref 0–5)
IMMATURE GRANULOCYTES %: 0.5 % (ref 0–5)
IMMATURE GRANULOCYTES %: 0.8 % (ref 0–5)
INR BLD: 1.1
KETONES, URINE: NEGATIVE MG/DL
LACTIC ACID: 2.5 MMOL/L (ref 0.5–2.2)
LDL CHOLESTEROL CALCULATED: 72 MG/DL (ref 0–99)
LEUKOCYTE ESTERASE, URINE: ABNORMAL
LIPASE: 22 U/L (ref 13–60)
LV EF: 50 %
LVEF MODALITY: NORMAL
LYMPHOCYTES ABSOLUTE: 0.36 E9/L (ref 1.5–4)
LYMPHOCYTES ABSOLUTE: 0.5 E9/L (ref 1.5–4)
LYMPHOCYTES ABSOLUTE: 0.57 E9/L (ref 1.5–4)
LYMPHOCYTES ABSOLUTE: 1.1 E9/L (ref 1.5–4)
LYMPHOCYTES ABSOLUTE: 1.28 E9/L (ref 1.5–4)
LYMPHOCYTES RELATIVE PERCENT: 10.1 % (ref 20–42)
LYMPHOCYTES RELATIVE PERCENT: 15 % (ref 20–42)
LYMPHOCYTES RELATIVE PERCENT: 2.6 % (ref 20–42)
LYMPHOCYTES RELATIVE PERCENT: 5 % (ref 20–42)
LYMPHOCYTES RELATIVE PERCENT: 5.1 % (ref 20–42)
MAGNESIUM: 1.9 MG/DL (ref 1.6–2.6)
MAGNESIUM: 2.1 MG/DL (ref 1.6–2.6)
MCH RBC QN AUTO: 29.8 PG (ref 26–35)
MCH RBC QN AUTO: 30.1 PG (ref 26–35)
MCH RBC QN AUTO: 30.4 PG (ref 26–35)
MCH RBC QN AUTO: 30.5 PG (ref 26–35)
MCH RBC QN AUTO: 30.8 PG (ref 26–35)
MCH RBC QN AUTO: 30.9 PG (ref 26–35)
MCH RBC QN AUTO: 31.1 PG (ref 26–35)
MCH RBC QN AUTO: 31.5 PG (ref 26–35)
MCH RBC QN AUTO: 31.7 PG (ref 26–35)
MCHC RBC AUTO-ENTMCNC: 31 % (ref 32–34.5)
MCHC RBC AUTO-ENTMCNC: 31.4 % (ref 32–34.5)
MCHC RBC AUTO-ENTMCNC: 31.7 % (ref 32–34.5)
MCHC RBC AUTO-ENTMCNC: 31.7 % (ref 32–34.5)
MCHC RBC AUTO-ENTMCNC: 31.9 % (ref 32–34.5)
MCHC RBC AUTO-ENTMCNC: 32.1 % (ref 32–34.5)
MCHC RBC AUTO-ENTMCNC: 32.1 % (ref 32–34.5)
MCHC RBC AUTO-ENTMCNC: 32.2 % (ref 32–34.5)
MCHC RBC AUTO-ENTMCNC: 32.9 % (ref 32–34.5)
MCV RBC AUTO: 102.4 FL (ref 80–99.9)
MCV RBC AUTO: 93.8 FL (ref 80–99.9)
MCV RBC AUTO: 94.8 FL (ref 80–99.9)
MCV RBC AUTO: 94.9 FL (ref 80–99.9)
MCV RBC AUTO: 95.9 FL (ref 80–99.9)
MCV RBC AUTO: 96.1 FL (ref 80–99.9)
MCV RBC AUTO: 96.4 FL (ref 80–99.9)
MCV RBC AUTO: 96.5 FL (ref 80–99.9)
MCV RBC AUTO: 97.4 FL (ref 80–99.9)
METER GLUCOSE: 101 MG/DL (ref 74–99)
METER GLUCOSE: 118 MG/DL (ref 74–99)
METER GLUCOSE: 120 MG/DL (ref 74–99)
METER GLUCOSE: 127 MG/DL (ref 74–99)
METER GLUCOSE: 133 MG/DL (ref 74–99)
METER GLUCOSE: 133 MG/DL (ref 74–99)
METER GLUCOSE: 137 MG/DL (ref 74–99)
METER GLUCOSE: 138 MG/DL (ref 74–99)
METER GLUCOSE: 141 MG/DL (ref 74–99)
METER GLUCOSE: 142 MG/DL (ref 74–99)
METER GLUCOSE: 149 MG/DL (ref 74–99)
METER GLUCOSE: 151 MG/DL (ref 74–99)
METER GLUCOSE: 157 MG/DL (ref 74–99)
METER GLUCOSE: 169 MG/DL (ref 74–99)
METER GLUCOSE: 173 MG/DL (ref 74–99)
METER GLUCOSE: 174 MG/DL (ref 74–99)
METER GLUCOSE: 184 MG/DL (ref 74–99)
METER GLUCOSE: 199 MG/DL (ref 74–99)
METER GLUCOSE: 210 MG/DL (ref 74–99)
METER GLUCOSE: 223 MG/DL (ref 74–99)
METER GLUCOSE: 278 MG/DL (ref 74–99)
MONOCYTES ABSOLUTE: 0.44 E9/L (ref 0.1–0.95)
MONOCYTES ABSOLUTE: 0.69 E9/L (ref 0.1–0.95)
MONOCYTES ABSOLUTE: 0.71 E9/L (ref 0.1–0.95)
MONOCYTES ABSOLUTE: 0.86 E9/L (ref 0.1–0.95)
MONOCYTES ABSOLUTE: 1 E9/L (ref 0.1–0.95)
MONOCYTES RELATIVE PERCENT: 13.7 % (ref 2–12)
MONOCYTES RELATIVE PERCENT: 3.2 % (ref 2–12)
MONOCYTES RELATIVE PERCENT: 6.4 % (ref 2–12)
MONOCYTES RELATIVE PERCENT: 6.8 % (ref 2–12)
MONOCYTES RELATIVE PERCENT: 7 % (ref 2–12)
MRSA CULTURE ONLY: NORMAL
NEUTROPHILS ABSOLUTE: 10.3 E9/L (ref 1.8–7.3)
NEUTROPHILS ABSOLUTE: 12.79 E9/L (ref 1.8–7.3)
NEUTROPHILS ABSOLUTE: 4.97 E9/L (ref 1.8–7.3)
NEUTROPHILS ABSOLUTE: 8.71 E9/L (ref 1.8–7.3)
NEUTROPHILS ABSOLUTE: 9.6 E9/L (ref 1.8–7.3)
NEUTROPHILS RELATIVE PERCENT: 68.1 % (ref 43–80)
NEUTROPHILS RELATIVE PERCENT: 80.9 % (ref 43–80)
NEUTROPHILS RELATIVE PERCENT: 86.4 % (ref 43–80)
NEUTROPHILS RELATIVE PERCENT: 88 % (ref 43–80)
NEUTROPHILS RELATIVE PERCENT: 92.1 % (ref 43–80)
NITRITE, URINE: NEGATIVE
PDW BLD-RTO: 12.4 FL (ref 11.5–15)
PDW BLD-RTO: 14.3 FL (ref 11.5–15)
PDW BLD-RTO: 14.5 FL (ref 11.5–15)
PDW BLD-RTO: 14.9 FL (ref 11.5–15)
PDW BLD-RTO: 15.3 FL (ref 11.5–15)
PDW BLD-RTO: 16.5 FL (ref 11.5–15)
PDW BLD-RTO: 17.8 FL (ref 11.5–15)
PDW BLD-RTO: 18.1 FL (ref 11.5–15)
PDW BLD-RTO: 18.4 FL (ref 11.5–15)
PH UA: 6 (ref 5–9)
PLATELET # BLD: 259 E9/L (ref 130–450)
PLATELET # BLD: 259 E9/L (ref 130–450)
PLATELET # BLD: 321 E9/L (ref 130–450)
PLATELET # BLD: 324 E9/L (ref 130–450)
PLATELET # BLD: 330 E9/L (ref 130–450)
PLATELET # BLD: 338 E9/L (ref 130–450)
PLATELET # BLD: 374 E9/L (ref 130–450)
PLATELET # BLD: 384 E9/L (ref 130–450)
PLATELET # BLD: 437 E9/L (ref 130–450)
PMV BLD AUTO: 10 FL (ref 7–12)
PMV BLD AUTO: 10.3 FL (ref 7–12)
PMV BLD AUTO: 10.3 FL (ref 7–12)
PMV BLD AUTO: 10.4 FL (ref 7–12)
PMV BLD AUTO: 10.5 FL (ref 7–12)
PMV BLD AUTO: 10.6 FL (ref 7–12)
PMV BLD AUTO: 10.7 FL (ref 7–12)
PMV BLD AUTO: 11.2 FL (ref 7–12)
PMV BLD AUTO: 12.6 FL (ref 7–12)
POLYCHROMASIA: ABNORMAL
POTASSIUM SERPL-SCNC: 3 MMOL/L (ref 3.5–5)
POTASSIUM SERPL-SCNC: 3.2 MMOL/L (ref 3.5–5)
POTASSIUM SERPL-SCNC: 3.2 MMOL/L (ref 3.5–5)
POTASSIUM SERPL-SCNC: 3.4 MMOL/L (ref 3.5–5)
POTASSIUM SERPL-SCNC: 3.6 MMOL/L (ref 3.5–5)
POTASSIUM SERPL-SCNC: 3.8 MMOL/L (ref 3.5–5)
POTASSIUM SERPL-SCNC: 4.1 MMOL/L (ref 3.5–5)
POTASSIUM SERPL-SCNC: 4.6 MMOL/L (ref 3.5–5)
PRO-BNP: 3325 PG/ML (ref 0–450)
PRO-BNP: 3483 PG/ML (ref 0–450)
PROTEIN UA: ABNORMAL MG/DL
PROTHROMBIN TIME: 12.2 SEC (ref 9.3–12.4)
RBC # BLD: 1.67 E12/L (ref 3.5–5.5)
RBC # BLD: 2.3 E12/L (ref 3.5–5.5)
RBC # BLD: 3.06 E12/L (ref 3.5–5.5)
RBC # BLD: 3.09 E12/L (ref 3.5–5.5)
RBC # BLD: 3.12 E12/L (ref 3.5–5.5)
RBC # BLD: 3.14 E12/L (ref 3.5–5.5)
RBC # BLD: 3.35 E12/L (ref 3.5–5.5)
RBC # BLD: 3.52 E12/L (ref 3.5–5.5)
RBC # BLD: 3.84 E12/L (ref 3.5–5.5)
RBC # BLD: NORMAL 10*6/UL
RBC UA: ABNORMAL /HPF (ref 0–2)
REASON FOR REJECTION: NORMAL
REJECTED TEST: NORMAL
SODIUM BLD-SCNC: 134 MMOL/L (ref 132–146)
SODIUM BLD-SCNC: 136 MMOL/L (ref 132–146)
SODIUM BLD-SCNC: 137 MMOL/L (ref 132–146)
SODIUM BLD-SCNC: 138 MMOL/L (ref 132–146)
SODIUM BLD-SCNC: 139 MMOL/L (ref 132–146)
SODIUM BLD-SCNC: 139 MMOL/L (ref 132–146)
SPECIFIC GRAVITY UA: 1.01 (ref 1–1.03)
TOTAL PROTEIN: 5.1 G/DL (ref 6.4–8.3)
TOTAL PROTEIN: 6 G/DL (ref 6.4–8.3)
TOTAL PROTEIN: 7.5 G/DL (ref 6.4–8.3)
TRIGL SERPL-MCNC: 122 MG/DL (ref 0–149)
TROPONIN: 0.03 NG/ML (ref 0–0.03)
TROPONIN: 0.04 NG/ML (ref 0–0.03)
TROPONIN: 0.06 NG/ML (ref 0–0.03)
TROPONIN: 0.06 NG/ML (ref 0–0.03)
TROPONIN: 0.07 NG/ML (ref 0–0.03)
TSH SERPL DL<=0.05 MIU/L-ACNC: 12.75 UIU/ML (ref 0.27–4.2)
TSH SERPL DL<=0.05 MIU/L-ACNC: 17.7 UIU/ML (ref 0.27–4.2)
URINE CULTURE, ROUTINE: NORMAL
UROBILINOGEN, URINE: 0.2 E.U./DL
VLDLC SERPL CALC-MCNC: 24 MG/DL
WBC # BLD: 11.1 E9/L (ref 4.5–11.5)
WBC # BLD: 12.7 E9/L (ref 4.5–11.5)
WBC # BLD: 13.9 E9/L (ref 4.5–11.5)
WBC # BLD: 15.6 E9/L (ref 4.5–11.5)
WBC # BLD: 15.8 E9/L (ref 4.5–11.5)
WBC # BLD: 7.3 E9/L (ref 4.5–11.5)
WBC # BLD: 9.9 E9/L (ref 4.5–11.5)
WBC UA: >20 /HPF (ref 0–5)

## 2019-01-01 PROCEDURE — 80061 LIPID PANEL: CPT

## 2019-01-01 PROCEDURE — 83735 ASSAY OF MAGNESIUM: CPT

## 2019-01-01 PROCEDURE — 2580000003 HC RX 258: Performed by: INTERNAL MEDICINE

## 2019-01-01 PROCEDURE — 1200000000 HC SEMI PRIVATE

## 2019-01-01 PROCEDURE — 36415 COLL VENOUS BLD VENIPUNCTURE: CPT

## 2019-01-01 PROCEDURE — 97165 OT EVAL LOW COMPLEX 30 MIN: CPT

## 2019-01-01 PROCEDURE — 94660 CPAP INITIATION&MGMT: CPT

## 2019-01-01 PROCEDURE — 87088 URINE BACTERIA CULTURE: CPT

## 2019-01-01 PROCEDURE — 6360000002 HC RX W HCPCS: Performed by: FAMILY MEDICINE

## 2019-01-01 PROCEDURE — 84484 ASSAY OF TROPONIN QUANT: CPT

## 2019-01-01 PROCEDURE — 6370000000 HC RX 637 (ALT 250 FOR IP): Performed by: SURGERY

## 2019-01-01 PROCEDURE — APPSS60 APP SPLIT SHARED TIME 46-60 MINUTES: Performed by: NURSE PRACTITIONER

## 2019-01-01 PROCEDURE — 87081 CULTURE SCREEN ONLY: CPT

## 2019-01-01 PROCEDURE — 85027 COMPLETE CBC AUTOMATED: CPT

## 2019-01-01 PROCEDURE — 96374 THER/PROPH/DIAG INJ IV PUSH: CPT

## 2019-01-01 PROCEDURE — 2580000003 HC RX 258: Performed by: EMERGENCY MEDICINE

## 2019-01-01 PROCEDURE — 81001 URINALYSIS AUTO W/SCOPE: CPT

## 2019-01-01 PROCEDURE — 6370000000 HC RX 637 (ALT 250 FOR IP): Performed by: INTERNAL MEDICINE

## 2019-01-01 PROCEDURE — 85025 COMPLETE CBC W/AUTO DIFF WBC: CPT

## 2019-01-01 PROCEDURE — 99214 OFFICE O/P EST MOD 30 MIN: CPT | Performed by: NURSE PRACTITIONER

## 2019-01-01 PROCEDURE — 6370000000 HC RX 637 (ALT 250 FOR IP): Performed by: STUDENT IN AN ORGANIZED HEALTH CARE EDUCATION/TRAINING PROGRAM

## 2019-01-01 PROCEDURE — 70450 CT HEAD/BRAIN W/O DYE: CPT

## 2019-01-01 PROCEDURE — 80048 BASIC METABOLIC PNL TOTAL CA: CPT

## 2019-01-01 PROCEDURE — 0DJ08ZZ INSPECTION OF UPPER INTESTINAL TRACT, VIA NATURAL OR ARTIFICIAL OPENING ENDOSCOPIC: ICD-10-PCS | Performed by: SURGERY

## 2019-01-01 PROCEDURE — 97530 THERAPEUTIC ACTIVITIES: CPT

## 2019-01-01 PROCEDURE — 71045 X-RAY EXAM CHEST 1 VIEW: CPT

## 2019-01-01 PROCEDURE — 86922 COMPATIBILITY TEST ANTIGLOB: CPT

## 2019-01-01 PROCEDURE — 86880 COOMBS TEST DIRECT: CPT

## 2019-01-01 PROCEDURE — 93306 TTE W/DOPPLER COMPLETE: CPT

## 2019-01-01 PROCEDURE — 99232 SBSQ HOSP IP/OBS MODERATE 35: CPT | Performed by: INTERNAL MEDICINE

## 2019-01-01 PROCEDURE — 97161 PT EVAL LOW COMPLEX 20 MIN: CPT

## 2019-01-01 PROCEDURE — 36430 TRANSFUSION BLD/BLD COMPNT: CPT

## 2019-01-01 PROCEDURE — 2700000000 HC OXYGEN THERAPY PER DAY

## 2019-01-01 PROCEDURE — 87040 BLOOD CULTURE FOR BACTERIA: CPT

## 2019-01-01 PROCEDURE — C9113 INJ PANTOPRAZOLE SODIUM, VIA: HCPCS | Performed by: STUDENT IN AN ORGANIZED HEALTH CARE EDUCATION/TRAINING PROGRAM

## 2019-01-01 PROCEDURE — 93010 ELECTROCARDIOGRAM REPORT: CPT | Performed by: INTERNAL MEDICINE

## 2019-01-01 PROCEDURE — 2709999900 HC NON-CHARGEABLE SUPPLY: Performed by: SURGERY

## 2019-01-01 PROCEDURE — 82962 GLUCOSE BLOOD TEST: CPT

## 2019-01-01 PROCEDURE — 86850 RBC ANTIBODY SCREEN: CPT

## 2019-01-01 PROCEDURE — 3700000001 HC ADD 15 MINUTES (ANESTHESIA): Performed by: SURGERY

## 2019-01-01 PROCEDURE — 72125 CT NECK SPINE W/O DYE: CPT

## 2019-01-01 PROCEDURE — 83690 ASSAY OF LIPASE: CPT

## 2019-01-01 PROCEDURE — 83880 ASSAY OF NATRIURETIC PEPTIDE: CPT

## 2019-01-01 PROCEDURE — 99285 EMERGENCY DEPT VISIT HI MDM: CPT

## 2019-01-01 PROCEDURE — 6370000000 HC RX 637 (ALT 250 FOR IP): Performed by: EMERGENCY MEDICINE

## 2019-01-01 PROCEDURE — 86870 RBC ANTIBODY IDENTIFICATION: CPT

## 2019-01-01 PROCEDURE — P9016 RBC LEUKOCYTES REDUCED: HCPCS

## 2019-01-01 PROCEDURE — 6370000000 HC RX 637 (ALT 250 FOR IP): Performed by: FAMILY MEDICINE

## 2019-01-01 PROCEDURE — 6360000002 HC RX W HCPCS: Performed by: EMERGENCY MEDICINE

## 2019-01-01 PROCEDURE — 86900 BLOOD TYPING SEROLOGIC ABO: CPT

## 2019-01-01 PROCEDURE — 6360000002 HC RX W HCPCS: Performed by: INTERNAL MEDICINE

## 2019-01-01 PROCEDURE — 6360000002 HC RX W HCPCS: Performed by: NURSE PRACTITIONER

## 2019-01-01 PROCEDURE — 94761 N-INVAS EAR/PLS OXIMETRY MLT: CPT

## 2019-01-01 PROCEDURE — 84443 ASSAY THYROID STIM HORMONE: CPT

## 2019-01-01 PROCEDURE — 99223 1ST HOSP IP/OBS HIGH 75: CPT | Performed by: INTERNAL MEDICINE

## 2019-01-01 PROCEDURE — 83605 ASSAY OF LACTIC ACID: CPT

## 2019-01-01 PROCEDURE — 51702 INSERT TEMP BLADDER CATH: CPT

## 2019-01-01 PROCEDURE — 80053 COMPREHEN METABOLIC PANEL: CPT

## 2019-01-01 PROCEDURE — 93005 ELECTROCARDIOGRAM TRACING: CPT | Performed by: EMERGENCY MEDICINE

## 2019-01-01 PROCEDURE — 7100000011 HC PHASE II RECOVERY - ADDTL 15 MIN: Performed by: SURGERY

## 2019-01-01 PROCEDURE — 3600007511: Performed by: SURGERY

## 2019-01-01 PROCEDURE — 99284 EMERGENCY DEPT VISIT MOD MDM: CPT

## 2019-01-01 PROCEDURE — 83036 HEMOGLOBIN GLYCOSYLATED A1C: CPT

## 2019-01-01 PROCEDURE — 99232 SBSQ HOSP IP/OBS MODERATE 35: CPT | Performed by: EMERGENCY MEDICINE

## 2019-01-01 PROCEDURE — C9113 INJ PANTOPRAZOLE SODIUM, VIA: HCPCS | Performed by: EMERGENCY MEDICINE

## 2019-01-01 PROCEDURE — 6370000000 HC RX 637 (ALT 250 FOR IP): Performed by: NURSE PRACTITIONER

## 2019-01-01 PROCEDURE — 97110 THERAPEUTIC EXERCISES: CPT

## 2019-01-01 PROCEDURE — 3600007501: Performed by: SURGERY

## 2019-01-01 PROCEDURE — 93000 ELECTROCARDIOGRAM COMPLETE: CPT | Performed by: INTERNAL MEDICINE

## 2019-01-01 PROCEDURE — 94760 N-INVAS EAR/PLS OXIMETRY 1: CPT

## 2019-01-01 PROCEDURE — 86901 BLOOD TYPING SEROLOGIC RH(D): CPT

## 2019-01-01 PROCEDURE — 6360000002 HC RX W HCPCS: Performed by: NURSE ANESTHETIST, CERTIFIED REGISTERED

## 2019-01-01 PROCEDURE — 97535 SELF CARE MNGMENT TRAINING: CPT

## 2019-01-01 PROCEDURE — 2580000003 HC RX 258: Performed by: NURSE ANESTHETIST, CERTIFIED REGISTERED

## 2019-01-01 PROCEDURE — 7100000010 HC PHASE II RECOVERY - FIRST 15 MIN: Performed by: SURGERY

## 2019-01-01 PROCEDURE — 85610 PROTHROMBIN TIME: CPT

## 2019-01-01 PROCEDURE — 2500000003 HC RX 250 WO HCPCS: Performed by: NURSE ANESTHETIST, CERTIFIED REGISTERED

## 2019-01-01 PROCEDURE — 6360000002 HC RX W HCPCS: Performed by: STUDENT IN AN ORGANIZED HEALTH CARE EDUCATION/TRAINING PROGRAM

## 2019-01-01 PROCEDURE — 3700000000 HC ANESTHESIA ATTENDED CARE: Performed by: SURGERY

## 2019-01-01 RX ORDER — SUCRALFATE ORAL 1 G/10ML
1 SUSPENSION ORAL EVERY 6 HOURS SCHEDULED
Status: DISCONTINUED | OUTPATIENT
Start: 2019-01-01 | End: 2019-01-01 | Stop reason: CLARIF

## 2019-01-01 RX ORDER — HYDRALAZINE HYDROCHLORIDE 10 MG/1
10 TABLET, FILM COATED ORAL EVERY 12 HOURS SCHEDULED
Status: DISCONTINUED | OUTPATIENT
Start: 2019-01-01 | End: 2019-01-01 | Stop reason: HOSPADM

## 2019-01-01 RX ORDER — TRAMADOL HYDROCHLORIDE 50 MG/1
50 TABLET ORAL ONCE
Status: COMPLETED | OUTPATIENT
Start: 2019-01-01 | End: 2019-01-01

## 2019-01-01 RX ORDER — TORSEMIDE 20 MG/1
20 TABLET ORAL DAILY
Qty: 30 TABLET | Refills: 3 | Status: SHIPPED | OUTPATIENT
Start: 2019-01-01 | End: 2019-01-01

## 2019-01-01 RX ORDER — SODIUM CHLORIDE 0.9 % (FLUSH) 0.9 %
10 SYRINGE (ML) INJECTION PRN
Status: DISCONTINUED | OUTPATIENT
Start: 2019-01-01 | End: 2019-01-01 | Stop reason: SDUPTHER

## 2019-01-01 RX ORDER — POLYETHYLENE GLYCOL 3350 17 G/17G
17 POWDER, FOR SOLUTION ORAL
Status: ACTIVE | OUTPATIENT
Start: 2019-01-01 | End: 2019-01-01

## 2019-01-01 RX ORDER — ACETAMINOPHEN 325 MG/1
650 TABLET ORAL EVERY 4 HOURS PRN
Status: DISCONTINUED | OUTPATIENT
Start: 2019-01-01 | End: 2019-01-01 | Stop reason: HOSPADM

## 2019-01-01 RX ORDER — PANTOPRAZOLE SODIUM 40 MG/1
40 TABLET, DELAYED RELEASE ORAL
Qty: 30 TABLET | Refills: 3 | Status: SHIPPED | OUTPATIENT
Start: 2019-01-01

## 2019-01-01 RX ORDER — POTASSIUM CHLORIDE 20 MEQ/1
40 TABLET, EXTENDED RELEASE ORAL
Status: DISCONTINUED | OUTPATIENT
Start: 2019-01-01 | End: 2019-01-01 | Stop reason: HOSPADM

## 2019-01-01 RX ORDER — TORSEMIDE 20 MG/1
20 TABLET ORAL 2 TIMES DAILY
Qty: 60 TABLET | Refills: 3 | Status: SHIPPED | OUTPATIENT
Start: 2019-01-01

## 2019-01-01 RX ORDER — 0.9 % SODIUM CHLORIDE 0.9 %
10 VIAL (ML) INJECTION 2 TIMES DAILY
Status: DISCONTINUED | OUTPATIENT
Start: 2019-01-01 | End: 2019-01-01

## 2019-01-01 RX ORDER — POTASSIUM CHLORIDE 20 MEQ/1
20 TABLET, EXTENDED RELEASE ORAL
Status: DISCONTINUED | OUTPATIENT
Start: 2019-01-01 | End: 2019-01-01

## 2019-01-01 RX ORDER — TRAMADOL HYDROCHLORIDE 50 MG/1
50 TABLET ORAL EVERY 6 HOURS PRN
Status: DISCONTINUED | OUTPATIENT
Start: 2019-01-01 | End: 2019-01-01 | Stop reason: HOSPADM

## 2019-01-01 RX ORDER — PANTOPRAZOLE SODIUM 40 MG/1
40 TABLET, DELAYED RELEASE ORAL
Status: DISCONTINUED | OUTPATIENT
Start: 2019-01-01 | End: 2019-01-01 | Stop reason: HOSPADM

## 2019-01-01 RX ORDER — PANTOPRAZOLE SODIUM 40 MG/10ML
80 INJECTION, POWDER, LYOPHILIZED, FOR SOLUTION INTRAVENOUS ONCE
Status: COMPLETED | OUTPATIENT
Start: 2019-01-01 | End: 2019-01-01

## 2019-01-01 RX ORDER — SODIUM CHLORIDE 0.9 % (FLUSH) 0.9 %
10 SYRINGE (ML) INJECTION PRN
Status: DISCONTINUED | OUTPATIENT
Start: 2019-01-01 | End: 2019-01-01 | Stop reason: HOSPADM

## 2019-01-01 RX ORDER — SODIUM CHLORIDE, SODIUM LACTATE, POTASSIUM CHLORIDE, CALCIUM CHLORIDE 600; 310; 30; 20 MG/100ML; MG/100ML; MG/100ML; MG/100ML
INJECTION, SOLUTION INTRAVENOUS CONTINUOUS PRN
Status: DISCONTINUED | OUTPATIENT
Start: 2019-01-01 | End: 2019-01-01 | Stop reason: SDUPTHER

## 2019-01-01 RX ORDER — FERROUS SULFATE 325(65) MG
325 TABLET ORAL 2 TIMES DAILY WITH MEALS
Status: DISCONTINUED | OUTPATIENT
Start: 2019-01-01 | End: 2019-01-01 | Stop reason: HOSPADM

## 2019-01-01 RX ORDER — HYDRALAZINE HYDROCHLORIDE 10 MG/1
10 TABLET, FILM COATED ORAL EVERY 12 HOURS SCHEDULED
Qty: 90 TABLET | Refills: 3 | Status: SHIPPED | OUTPATIENT
Start: 2019-01-01

## 2019-01-01 RX ORDER — FUROSEMIDE 10 MG/ML
40 INJECTION INTRAMUSCULAR; INTRAVENOUS ONCE
Status: COMPLETED | OUTPATIENT
Start: 2019-01-01 | End: 2019-01-01

## 2019-01-01 RX ORDER — TORSEMIDE 20 MG/1
20 TABLET ORAL DAILY
Status: DISCONTINUED | OUTPATIENT
Start: 2019-01-01 | End: 2019-01-01 | Stop reason: HOSPADM

## 2019-01-01 RX ORDER — SUCRALFATE 1 G/1
1 TABLET ORAL EVERY 6 HOURS SCHEDULED
Status: DISCONTINUED | OUTPATIENT
Start: 2019-01-01 | End: 2019-01-01 | Stop reason: HOSPADM

## 2019-01-01 RX ORDER — POTASSIUM CHLORIDE 20 MEQ/1
20 TABLET, EXTENDED RELEASE ORAL 2 TIMES DAILY WITH MEALS
Status: DISCONTINUED | OUTPATIENT
Start: 2019-01-01 | End: 2019-01-01

## 2019-01-01 RX ORDER — CEPHALEXIN 500 MG/1
500 CAPSULE ORAL 3 TIMES DAILY
COMMUNITY
Start: 2019-01-01 | End: 2019-01-01

## 2019-01-01 RX ORDER — SUCRALFATE 1 G/1
1 TABLET ORAL 4 TIMES DAILY
Qty: 120 TABLET | Refills: 3 | Status: SHIPPED | OUTPATIENT
Start: 2019-01-01

## 2019-01-01 RX ORDER — SODIUM CHLORIDE 0.9 % (FLUSH) 0.9 %
10 SYRINGE (ML) INJECTION EVERY 12 HOURS SCHEDULED
Status: DISCONTINUED | OUTPATIENT
Start: 2019-01-01 | End: 2019-01-01 | Stop reason: HOSPADM

## 2019-01-01 RX ORDER — FUROSEMIDE 20 MG/1
20 TABLET ORAL DAILY
Status: DISCONTINUED | OUTPATIENT
Start: 2019-01-01 | End: 2019-01-01 | Stop reason: HOSPADM

## 2019-01-01 RX ORDER — LEVOTHYROXINE SODIUM 0.1 MG/1
100 TABLET ORAL DAILY
Status: DISCONTINUED | OUTPATIENT
Start: 2019-01-01 | End: 2019-01-01 | Stop reason: HOSPADM

## 2019-01-01 RX ORDER — FUROSEMIDE 20 MG/1
20 TABLET ORAL EVERY MORNING
Status: ON HOLD | COMMUNITY
Start: 2019-01-01 | End: 2019-01-01 | Stop reason: HOSPADM

## 2019-01-01 RX ORDER — POTASSIUM CHLORIDE 20 MEQ/1
40 TABLET, EXTENDED RELEASE ORAL ONCE
Status: COMPLETED | OUTPATIENT
Start: 2019-01-01 | End: 2019-01-01

## 2019-01-01 RX ORDER — LIDOCAINE HYDROCHLORIDE 20 MG/ML
INJECTION, SOLUTION EPIDURAL; INFILTRATION; INTRACAUDAL; PERINEURAL PRN
Status: DISCONTINUED | OUTPATIENT
Start: 2019-01-01 | End: 2019-01-01 | Stop reason: SDUPTHER

## 2019-01-01 RX ORDER — EPHEDRINE SULFATE/0.9% NACL/PF 50 MG/5 ML
SYRINGE (ML) INTRAVENOUS PRN
Status: DISCONTINUED | OUTPATIENT
Start: 2019-01-01 | End: 2019-01-01 | Stop reason: SDUPTHER

## 2019-01-01 RX ORDER — DEXTROSE AND SODIUM CHLORIDE 5; .9 G/100ML; G/100ML
INJECTION, SOLUTION INTRAVENOUS CONTINUOUS
Status: DISCONTINUED | OUTPATIENT
Start: 2019-01-01 | End: 2019-01-01 | Stop reason: HOSPADM

## 2019-01-01 RX ORDER — VITS A,C,E/LUTEIN/MINERALS 300MCG-200
1 TABLET ORAL DAILY
Status: DISCONTINUED | OUTPATIENT
Start: 2019-01-01 | End: 2019-01-01 | Stop reason: HOSPADM

## 2019-01-01 RX ORDER — TRAZODONE HYDROCHLORIDE 50 MG/1
50 TABLET ORAL NIGHTLY
Status: DISCONTINUED | OUTPATIENT
Start: 2019-01-01 | End: 2019-01-01 | Stop reason: HOSPADM

## 2019-01-01 RX ORDER — 0.9 % SODIUM CHLORIDE 0.9 %
250 INTRAVENOUS SOLUTION INTRAVENOUS ONCE
Status: COMPLETED | OUTPATIENT
Start: 2019-01-01 | End: 2019-01-01

## 2019-01-01 RX ORDER — AMLODIPINE BESYLATE 5 MG/1
5 TABLET ORAL 2 TIMES DAILY
Status: ON HOLD | COMMUNITY
End: 2019-01-01 | Stop reason: HOSPADM

## 2019-01-01 RX ORDER — ISOSORBIDE MONONITRATE 60 MG/1
60 TABLET, EXTENDED RELEASE ORAL DAILY
Status: DISCONTINUED | OUTPATIENT
Start: 2019-01-01 | End: 2019-01-01 | Stop reason: HOSPADM

## 2019-01-01 RX ORDER — AMLODIPINE BESYLATE 10 MG/1
10 TABLET ORAL DAILY
Status: DISCONTINUED | OUTPATIENT
Start: 2019-01-01 | End: 2019-01-01

## 2019-01-01 RX ORDER — FUROSEMIDE 10 MG/ML
20 INJECTION INTRAMUSCULAR; INTRAVENOUS 2 TIMES DAILY
Status: DISCONTINUED | OUTPATIENT
Start: 2019-01-01 | End: 2019-01-01

## 2019-01-01 RX ORDER — PROPOFOL 10 MG/ML
INJECTION, EMULSION INTRAVENOUS PRN
Status: DISCONTINUED | OUTPATIENT
Start: 2019-01-01 | End: 2019-01-01 | Stop reason: SDUPTHER

## 2019-01-01 RX ORDER — FUROSEMIDE 10 MG/ML
40 INJECTION INTRAMUSCULAR; INTRAVENOUS 2 TIMES DAILY
Status: DISCONTINUED | OUTPATIENT
Start: 2019-01-01 | End: 2019-01-01

## 2019-01-01 RX ORDER — PANTOPRAZOLE SODIUM 40 MG/10ML
40 INJECTION, POWDER, LYOPHILIZED, FOR SOLUTION INTRAVENOUS 2 TIMES DAILY
Status: DISCONTINUED | OUTPATIENT
Start: 2019-01-01 | End: 2019-01-01

## 2019-01-01 RX ORDER — BISACODYL 10 MG
10 SUPPOSITORY, RECTAL RECTAL DAILY PRN
COMMUNITY

## 2019-01-01 RX ORDER — FENTANYL CITRATE 50 UG/ML
25 INJECTION, SOLUTION INTRAMUSCULAR; INTRAVENOUS ONCE
Status: COMPLETED | OUTPATIENT
Start: 2019-01-01 | End: 2019-01-01

## 2019-01-01 RX ORDER — AMLODIPINE BESYLATE 5 MG/1
5 TABLET ORAL 2 TIMES DAILY
Status: DISCONTINUED | OUTPATIENT
Start: 2019-01-01 | End: 2019-01-01 | Stop reason: HOSPADM

## 2019-01-01 RX ORDER — SENNA AND DOCUSATE SODIUM 50; 8.6 MG/1; MG/1
2 TABLET, FILM COATED ORAL DAILY PRN
Status: DISCONTINUED | OUTPATIENT
Start: 2019-01-01 | End: 2019-01-01 | Stop reason: HOSPADM

## 2019-01-01 RX ORDER — POTASSIUM CHLORIDE 7.45 MG/ML
10 INJECTION INTRAVENOUS
Status: DISCONTINUED | OUTPATIENT
Start: 2019-01-01 | End: 2019-01-01

## 2019-01-01 RX ADMIN — SUCRALFATE 1 G: 1 TABLET ORAL at 06:14

## 2019-01-01 RX ADMIN — FERROUS SULFATE TAB 325 MG (65 MG ELEMENTAL FE) 325 MG: 325 (65 FE) TAB at 08:37

## 2019-01-01 RX ADMIN — LEVOTHYROXINE SODIUM 100 MCG: 100 TABLET ORAL at 12:42

## 2019-01-01 RX ADMIN — SUCRALFATE 1 G: 1 TABLET ORAL at 00:26

## 2019-01-01 RX ADMIN — DEXTROSE AND SODIUM CHLORIDE: 5; 900 INJECTION, SOLUTION INTRAVENOUS at 22:42

## 2019-01-01 RX ADMIN — LEVOTHYROXINE SODIUM 100 MCG: 100 TABLET ORAL at 06:14

## 2019-01-01 RX ADMIN — SUCRALFATE 1 G: 1 TABLET ORAL at 06:12

## 2019-01-01 RX ADMIN — TRAMADOL HYDROCHLORIDE 50 MG: 50 TABLET, FILM COATED ORAL at 18:06

## 2019-01-01 RX ADMIN — Medication 10 ML: at 09:06

## 2019-01-01 RX ADMIN — TRAZODONE HYDROCHLORIDE 50 MG: 50 TABLET ORAL at 22:14

## 2019-01-01 RX ADMIN — TRAZODONE HYDROCHLORIDE 50 MG: 50 TABLET ORAL at 21:00

## 2019-01-01 RX ADMIN — Medication 10 MG: at 08:25

## 2019-01-01 RX ADMIN — Medication 1 TABLET: at 09:10

## 2019-01-01 RX ADMIN — FERROUS SULFATE TAB 325 MG (65 MG ELEMENTAL FE) 325 MG: 325 (65 FE) TAB at 17:40

## 2019-01-01 RX ADMIN — Medication 10 ML: at 08:38

## 2019-01-01 RX ADMIN — Medication 10 ML: at 22:19

## 2019-01-01 RX ADMIN — Medication 10 ML: at 20:51

## 2019-01-01 RX ADMIN — SUCRALFATE 1 G: 1 TABLET ORAL at 17:49

## 2019-01-01 RX ADMIN — Medication 10 ML: at 09:56

## 2019-01-01 RX ADMIN — TRAMADOL HYDROCHLORIDE 50 MG: 50 TABLET, FILM COATED ORAL at 15:08

## 2019-01-01 RX ADMIN — SUCRALFATE 1 G: 1 TABLET ORAL at 06:36

## 2019-01-01 RX ADMIN — PANTOPRAZOLE SODIUM 40 MG: 40 TABLET, DELAYED RELEASE ORAL at 06:11

## 2019-01-01 RX ADMIN — Medication 1 TABLET: at 10:05

## 2019-01-01 RX ADMIN — INSULIN LISPRO 1 UNITS: 100 INJECTION, SOLUTION INTRAVENOUS; SUBCUTANEOUS at 20:17

## 2019-01-01 RX ADMIN — POTASSIUM CHLORIDE 40 MEQ: 20 TABLET, EXTENDED RELEASE ORAL at 12:42

## 2019-01-01 RX ADMIN — SUCRALFATE 1 G: 1 TABLET ORAL at 17:04

## 2019-01-01 RX ADMIN — SUCRALFATE 1 G: 1 TABLET ORAL at 12:20

## 2019-01-01 RX ADMIN — FERROUS SULFATE TAB 325 MG (65 MG ELEMENTAL FE) 325 MG: 325 (65 FE) TAB at 17:04

## 2019-01-01 RX ADMIN — FUROSEMIDE 40 MG: 10 INJECTION, SOLUTION INTRAMUSCULAR; INTRAVENOUS at 08:37

## 2019-01-01 RX ADMIN — AMLODIPINE BESYLATE 5 MG: 5 TABLET ORAL at 20:35

## 2019-01-01 RX ADMIN — FUROSEMIDE 40 MG: 10 INJECTION, SOLUTION INTRAMUSCULAR; INTRAVENOUS at 20:17

## 2019-01-01 RX ADMIN — HYDRALAZINE HYDROCHLORIDE 10 MG: 10 TABLET, FILM COATED ORAL at 08:40

## 2019-01-01 RX ADMIN — POTASSIUM CHLORIDE 20 MEQ: 20 TABLET, EXTENDED RELEASE ORAL at 08:37

## 2019-01-01 RX ADMIN — SUCRALFATE 1 G: 1 TABLET ORAL at 17:40

## 2019-01-01 RX ADMIN — INSULIN LISPRO 1 UNITS: 100 INJECTION, SOLUTION INTRAVENOUS; SUBCUTANEOUS at 11:50

## 2019-01-01 RX ADMIN — LINAGLIPTIN 5 MG: 5 TABLET, FILM COATED ORAL at 09:10

## 2019-01-01 RX ADMIN — ACETAMINOPHEN 650 MG: 325 TABLET ORAL at 22:20

## 2019-01-01 RX ADMIN — POTASSIUM CHLORIDE 20 MEQ: 20 TABLET, EXTENDED RELEASE ORAL at 08:54

## 2019-01-01 RX ADMIN — PANTOPRAZOLE SODIUM 40 MG: 40 TABLET, DELAYED RELEASE ORAL at 06:12

## 2019-01-01 RX ADMIN — ISOSORBIDE MONONITRATE 60 MG: 60 TABLET, EXTENDED RELEASE ORAL at 10:15

## 2019-01-01 RX ADMIN — TRAMADOL HYDROCHLORIDE 50 MG: 50 TABLET, FILM COATED ORAL at 23:28

## 2019-01-01 RX ADMIN — ENOXAPARIN SODIUM 30 MG: 30 INJECTION SUBCUTANEOUS at 12:42

## 2019-01-01 RX ADMIN — FERROUS SULFATE TAB 325 MG (65 MG ELEMENTAL FE) 325 MG: 325 (65 FE) TAB at 17:06

## 2019-01-01 RX ADMIN — FERROUS SULFATE TAB 325 MG (65 MG ELEMENTAL FE) 325 MG: 325 (65 FE) TAB at 18:03

## 2019-01-01 RX ADMIN — Medication 1 TABLET: at 10:15

## 2019-01-01 RX ADMIN — HYDRALAZINE HYDROCHLORIDE 10 MG: 10 TABLET, FILM COATED ORAL at 09:06

## 2019-01-01 RX ADMIN — DEXTROSE AND SODIUM CHLORIDE: 5; 900 INJECTION, SOLUTION INTRAVENOUS at 13:33

## 2019-01-01 RX ADMIN — FENTANYL CITRATE 25 MCG: 50 INJECTION, SOLUTION INTRAMUSCULAR; INTRAVENOUS at 10:08

## 2019-01-01 RX ADMIN — FUROSEMIDE 20 MG: 20 TABLET ORAL at 10:05

## 2019-01-01 RX ADMIN — ISOSORBIDE MONONITRATE 60 MG: 60 TABLET, EXTENDED RELEASE ORAL at 08:13

## 2019-01-01 RX ADMIN — DEXTROSE AND SODIUM CHLORIDE: 5; 900 INJECTION, SOLUTION INTRAVENOUS at 03:30

## 2019-01-01 RX ADMIN — SUCRALFATE 1 G: 1 TABLET ORAL at 00:55

## 2019-01-01 RX ADMIN — SUCRALFATE 1 G: 1 TABLET ORAL at 18:03

## 2019-01-01 RX ADMIN — Medication 10 ML: at 18:03

## 2019-01-01 RX ADMIN — SUCRALFATE 1 G: 1 TABLET ORAL at 05:54

## 2019-01-01 RX ADMIN — INSULIN LISPRO 1 UNITS: 100 INJECTION, SOLUTION INTRAVENOUS; SUBCUTANEOUS at 18:03

## 2019-01-01 RX ADMIN — INSULIN LISPRO 1 UNITS: 100 INJECTION, SOLUTION INTRAVENOUS; SUBCUTANEOUS at 16:59

## 2019-01-01 RX ADMIN — FERROUS SULFATE TAB 325 MG (65 MG ELEMENTAL FE) 325 MG: 325 (65 FE) TAB at 08:40

## 2019-01-01 RX ADMIN — PANTOPRAZOLE SODIUM 40 MG: 40 TABLET, DELAYED RELEASE ORAL at 06:14

## 2019-01-01 RX ADMIN — LEVOTHYROXINE SODIUM 100 MCG: 100 TABLET ORAL at 06:32

## 2019-01-01 RX ADMIN — TRAMADOL HYDROCHLORIDE 50 MG: 50 TABLET, FILM COATED ORAL at 22:47

## 2019-01-01 RX ADMIN — SUCRALFATE 1 G: 1 TABLET ORAL at 12:42

## 2019-01-01 RX ADMIN — SUCRALFATE 1 G: 1 TABLET ORAL at 12:05

## 2019-01-01 RX ADMIN — ISOSORBIDE MONONITRATE 60 MG: 60 TABLET, EXTENDED RELEASE ORAL at 15:08

## 2019-01-01 RX ADMIN — FERROUS SULFATE TAB 325 MG (65 MG ELEMENTAL FE) 325 MG: 325 (65 FE) TAB at 18:08

## 2019-01-01 RX ADMIN — ENOXAPARIN SODIUM 30 MG: 30 INJECTION SUBCUTANEOUS at 11:54

## 2019-01-01 RX ADMIN — INSULIN LISPRO 1 UNITS: 100 INJECTION, SOLUTION INTRAVENOUS; SUBCUTANEOUS at 12:42

## 2019-01-01 RX ADMIN — SUCRALFATE 1 G: 1 TABLET ORAL at 12:01

## 2019-01-01 RX ADMIN — TRAZODONE HYDROCHLORIDE 50 MG: 50 TABLET ORAL at 20:35

## 2019-01-01 RX ADMIN — SUCRALFATE 1 G: 1 TABLET ORAL at 23:28

## 2019-01-01 RX ADMIN — DEXTROSE AND SODIUM CHLORIDE: 5; 900 INJECTION, SOLUTION INTRAVENOUS at 15:08

## 2019-01-01 RX ADMIN — FUROSEMIDE 20 MG: 20 TABLET ORAL at 08:13

## 2019-01-01 RX ADMIN — SODIUM CHLORIDE, POTASSIUM CHLORIDE, SODIUM LACTATE AND CALCIUM CHLORIDE: 600; 310; 30; 20 INJECTION, SOLUTION INTRAVENOUS at 08:20

## 2019-01-01 RX ADMIN — PANTOPRAZOLE SODIUM 40 MG: 40 TABLET, DELAYED RELEASE ORAL at 05:46

## 2019-01-01 RX ADMIN — FERROUS SULFATE TAB 325 MG (65 MG ELEMENTAL FE) 325 MG: 325 (65 FE) TAB at 08:13

## 2019-01-01 RX ADMIN — SUCRALFATE 1 G: 1 TABLET ORAL at 17:06

## 2019-01-01 RX ADMIN — PANTOPRAZOLE SODIUM 40 MG: 40 TABLET, DELAYED RELEASE ORAL at 06:31

## 2019-01-01 RX ADMIN — DEXTROSE AND SODIUM CHLORIDE: 5; 900 INJECTION, SOLUTION INTRAVENOUS at 17:08

## 2019-01-01 RX ADMIN — ACETAMINOPHEN 650 MG: 325 TABLET ORAL at 22:44

## 2019-01-01 RX ADMIN — ISOSORBIDE MONONITRATE 60 MG: 60 TABLET, EXTENDED RELEASE ORAL at 09:55

## 2019-01-01 RX ADMIN — SUCRALFATE 1 G: 1 TABLET ORAL at 11:50

## 2019-01-01 RX ADMIN — LINAGLIPTIN 5 MG: 5 TABLET, FILM COATED ORAL at 08:13

## 2019-01-01 RX ADMIN — LINAGLIPTIN 5 MG: 5 TABLET, FILM COATED ORAL at 09:55

## 2019-01-01 RX ADMIN — DEXTROSE AND SODIUM CHLORIDE: 5; 900 INJECTION, SOLUTION INTRAVENOUS at 04:05

## 2019-01-01 RX ADMIN — AMLODIPINE BESYLATE 5 MG: 5 TABLET ORAL at 08:13

## 2019-01-01 RX ADMIN — AMLODIPINE BESYLATE 5 MG: 5 TABLET ORAL at 20:51

## 2019-01-01 RX ADMIN — SUCRALFATE 1 G: 1 TABLET ORAL at 11:09

## 2019-01-01 RX ADMIN — Medication 1 TABLET: at 08:13

## 2019-01-01 RX ADMIN — Medication 10 ML: at 20:17

## 2019-01-01 RX ADMIN — FERROUS SULFATE TAB 325 MG (65 MG ELEMENTAL FE) 325 MG: 325 (65 FE) TAB at 09:55

## 2019-01-01 RX ADMIN — FERROUS SULFATE TAB 325 MG (65 MG ELEMENTAL FE) 325 MG: 325 (65 FE) TAB at 12:41

## 2019-01-01 RX ADMIN — HYDRALAZINE HYDROCHLORIDE 10 MG: 10 TABLET, FILM COATED ORAL at 20:50

## 2019-01-01 RX ADMIN — FERROUS SULFATE TAB 325 MG (65 MG ELEMENTAL FE) 325 MG: 325 (65 FE) TAB at 09:09

## 2019-01-01 RX ADMIN — PANTOPRAZOLE SODIUM 40 MG: 40 TABLET, DELAYED RELEASE ORAL at 06:36

## 2019-01-01 RX ADMIN — AMLODIPINE BESYLATE 5 MG: 5 TABLET ORAL at 10:15

## 2019-01-01 RX ADMIN — FERROUS SULFATE TAB 325 MG (65 MG ELEMENTAL FE) 325 MG: 325 (65 FE) TAB at 10:15

## 2019-01-01 RX ADMIN — PANTOPRAZOLE SODIUM 40 MG: 40 TABLET, DELAYED RELEASE ORAL at 10:15

## 2019-01-01 RX ADMIN — SUCRALFATE 1 G: 1 TABLET ORAL at 23:49

## 2019-01-01 RX ADMIN — TRAZODONE HYDROCHLORIDE 50 MG: 50 TABLET ORAL at 20:51

## 2019-01-01 RX ADMIN — ENOXAPARIN SODIUM 30 MG: 30 INJECTION SUBCUTANEOUS at 11:09

## 2019-01-01 RX ADMIN — INSULIN LISPRO 1 UNITS: 100 INJECTION, SOLUTION INTRAVENOUS; SUBCUTANEOUS at 20:56

## 2019-01-01 RX ADMIN — SUCRALFATE 1 G: 1 TABLET ORAL at 00:10

## 2019-01-01 RX ADMIN — ACETAMINOPHEN 650 MG: 325 TABLET ORAL at 03:22

## 2019-01-01 RX ADMIN — INSULIN LISPRO 1 UNITS: 100 INJECTION, SOLUTION INTRAVENOUS; SUBCUTANEOUS at 17:04

## 2019-01-01 RX ADMIN — AMLODIPINE BESYLATE 5 MG: 5 TABLET ORAL at 20:50

## 2019-01-01 RX ADMIN — FUROSEMIDE 40 MG: 10 INJECTION, SOLUTION INTRAMUSCULAR; INTRAVENOUS at 05:57

## 2019-01-01 RX ADMIN — FERROUS SULFATE TAB 325 MG (65 MG ELEMENTAL FE) 325 MG: 325 (65 FE) TAB at 18:06

## 2019-01-01 RX ADMIN — SUCRALFATE 1 G: 1 TABLET ORAL at 17:01

## 2019-01-01 RX ADMIN — TORSEMIDE 20 MG: 20 TABLET ORAL at 09:06

## 2019-01-01 RX ADMIN — FUROSEMIDE 20 MG: 20 TABLET ORAL at 09:10

## 2019-01-01 RX ADMIN — LINAGLIPTIN 5 MG: 5 TABLET, FILM COATED ORAL at 09:00

## 2019-01-01 RX ADMIN — ISOSORBIDE MONONITRATE 60 MG: 60 TABLET, EXTENDED RELEASE ORAL at 10:05

## 2019-01-01 RX ADMIN — SODIUM CHLORIDE 250 ML: 9 INJECTION, SOLUTION INTRAVENOUS at 10:25

## 2019-01-01 RX ADMIN — FERROUS SULFATE TAB 325 MG (65 MG ELEMENTAL FE) 325 MG: 325 (65 FE) TAB at 10:05

## 2019-01-01 RX ADMIN — PANTOPRAZOLE SODIUM 80 MG: 40 INJECTION, POWDER, LYOPHILIZED, FOR SOLUTION INTRAVENOUS at 10:11

## 2019-01-01 RX ADMIN — FUROSEMIDE 40 MG: 10 INJECTION, SOLUTION INTRAMUSCULAR; INTRAVENOUS at 17:04

## 2019-01-01 RX ADMIN — PROPOFOL 30 MG: 10 INJECTION, EMULSION INTRAVENOUS at 08:26

## 2019-01-01 RX ADMIN — PROPOFOL 30 MG: 10 INJECTION, EMULSION INTRAVENOUS at 08:29

## 2019-01-01 RX ADMIN — LEVOTHYROXINE SODIUM 100 MCG: 100 TABLET ORAL at 05:54

## 2019-01-01 RX ADMIN — FUROSEMIDE 40 MG: 10 INJECTION, SOLUTION INTRAMUSCULAR; INTRAVENOUS at 17:01

## 2019-01-01 RX ADMIN — AMLODIPINE BESYLATE 5 MG: 5 TABLET ORAL at 10:05

## 2019-01-01 RX ADMIN — SUCRALFATE 1 G: 1 TABLET ORAL at 06:32

## 2019-01-01 RX ADMIN — FERROUS SULFATE TAB 325 MG (65 MG ELEMENTAL FE) 325 MG: 325 (65 FE) TAB at 17:01

## 2019-01-01 RX ADMIN — POTASSIUM CHLORIDE 40 MEQ: 20 TABLET, EXTENDED RELEASE ORAL at 09:06

## 2019-01-01 RX ADMIN — FUROSEMIDE 40 MG: 10 INJECTION, SOLUTION INTRAMUSCULAR; INTRAVENOUS at 08:40

## 2019-01-01 RX ADMIN — SUCRALFATE 1 G: 1 TABLET ORAL at 05:46

## 2019-01-01 RX ADMIN — FUROSEMIDE 20 MG: 20 TABLET ORAL at 15:08

## 2019-01-01 RX ADMIN — AMLODIPINE BESYLATE 5 MG: 5 TABLET ORAL at 09:09

## 2019-01-01 RX ADMIN — TRAMADOL HYDROCHLORIDE 50 MG: 50 TABLET, FILM COATED ORAL at 06:34

## 2019-01-01 RX ADMIN — FUROSEMIDE 40 MG: 10 INJECTION, SOLUTION INTRAMUSCULAR; INTRAVENOUS at 18:03

## 2019-01-01 RX ADMIN — AMLODIPINE BESYLATE 5 MG: 5 TABLET ORAL at 15:16

## 2019-01-01 RX ADMIN — Medication 10 ML: at 17:02

## 2019-01-01 RX ADMIN — TRAMADOL HYDROCHLORIDE 50 MG: 50 TABLET, FILM COATED ORAL at 10:22

## 2019-01-01 RX ADMIN — SUCRALFATE 1 G: 1 TABLET ORAL at 00:35

## 2019-01-01 RX ADMIN — LINAGLIPTIN 5 MG: 5 TABLET, FILM COATED ORAL at 10:15

## 2019-01-01 RX ADMIN — LEVOTHYROXINE SODIUM 100 MCG: 100 TABLET ORAL at 06:36

## 2019-01-01 RX ADMIN — SUCRALFATE 1 G: 1 TABLET ORAL at 01:54

## 2019-01-01 RX ADMIN — FERROUS SULFATE TAB 325 MG (65 MG ELEMENTAL FE) 325 MG: 325 (65 FE) TAB at 16:45

## 2019-01-01 RX ADMIN — SUCRALFATE 1 G: 1 TABLET ORAL at 18:06

## 2019-01-01 RX ADMIN — Medication 10 ML: at 08:41

## 2019-01-01 RX ADMIN — PANTOPRAZOLE SODIUM 40 MG: 40 TABLET, DELAYED RELEASE ORAL at 05:54

## 2019-01-01 RX ADMIN — SUCRALFATE 1 G: 1 TABLET ORAL at 12:22

## 2019-01-01 RX ADMIN — ACETAMINOPHEN 650 MG: 325 TABLET ORAL at 20:35

## 2019-01-01 RX ADMIN — SENNOSIDES AND DOCUSATE SODIUM 2 TABLET: 8.6; 5 TABLET ORAL at 09:26

## 2019-01-01 RX ADMIN — LEVOTHYROXINE SODIUM 100 MCG: 100 TABLET ORAL at 06:11

## 2019-01-01 RX ADMIN — AMLODIPINE BESYLATE 5 MG: 5 TABLET ORAL at 09:55

## 2019-01-01 RX ADMIN — PANTOPRAZOLE SODIUM 40 MG: 40 INJECTION, POWDER, FOR SOLUTION INTRAVENOUS at 22:14

## 2019-01-01 RX ADMIN — LEVOTHYROXINE SODIUM 100 MCG: 100 TABLET ORAL at 05:47

## 2019-01-01 RX ADMIN — ENOXAPARIN SODIUM 30 MG: 30 INJECTION SUBCUTANEOUS at 12:05

## 2019-01-01 RX ADMIN — Medication 10 ML: at 10:08

## 2019-01-01 RX ADMIN — POTASSIUM CHLORIDE 20 MEQ: 20 TABLET, EXTENDED RELEASE ORAL at 08:40

## 2019-01-01 RX ADMIN — Medication 1 TABLET: at 15:16

## 2019-01-01 RX ADMIN — SUCRALFATE 1 G: 1 TABLET ORAL at 17:36

## 2019-01-01 RX ADMIN — FERROUS SULFATE TAB 325 MG (65 MG ELEMENTAL FE) 325 MG: 325 (65 FE) TAB at 17:32

## 2019-01-01 RX ADMIN — ENOXAPARIN SODIUM 30 MG: 30 INJECTION SUBCUTANEOUS at 11:50

## 2019-01-01 RX ADMIN — Medication 1 TABLET: at 09:55

## 2019-01-01 RX ADMIN — Medication 10 ML: at 22:02

## 2019-01-01 RX ADMIN — Medication 10 ML: at 17:03

## 2019-01-01 RX ADMIN — LEVOTHYROXINE SODIUM 100 MCG: 100 TABLET ORAL at 15:08

## 2019-01-01 RX ADMIN — SUCRALFATE 1 G: 1 TABLET ORAL at 11:54

## 2019-01-01 RX ADMIN — Medication 10 ML: at 20:35

## 2019-01-01 RX ADMIN — INSULIN LISPRO 1 UNITS: 100 INJECTION, SOLUTION INTRAVENOUS; SUBCUTANEOUS at 11:08

## 2019-01-01 RX ADMIN — Medication 10 ML: at 08:29

## 2019-01-01 RX ADMIN — FUROSEMIDE 20 MG: 20 TABLET ORAL at 09:55

## 2019-01-01 RX ADMIN — Medication 10 ML: at 08:55

## 2019-01-01 RX ADMIN — Medication 10 ML: at 12:24

## 2019-01-01 RX ADMIN — Medication 10 ML: at 20:49

## 2019-01-01 RX ADMIN — LIDOCAINE HYDROCHLORIDE 40 MG: 20 INJECTION, SOLUTION EPIDURAL; INFILTRATION; INTRACAUDAL; PERINEURAL at 08:26

## 2019-01-01 RX ADMIN — FUROSEMIDE 20 MG: 20 TABLET ORAL at 10:15

## 2019-01-01 RX ADMIN — FERROUS SULFATE TAB 325 MG (65 MG ELEMENTAL FE) 325 MG: 325 (65 FE) TAB at 09:06

## 2019-01-01 RX ADMIN — LINAGLIPTIN 5 MG: 5 TABLET, FILM COATED ORAL at 15:08

## 2019-01-01 RX ADMIN — FERROUS SULFATE TAB 325 MG (65 MG ELEMENTAL FE) 325 MG: 325 (65 FE) TAB at 08:54

## 2019-01-01 RX ADMIN — FUROSEMIDE 40 MG: 10 INJECTION, SOLUTION INTRAMUSCULAR; INTRAVENOUS at 08:54

## 2019-01-01 RX ADMIN — ISOSORBIDE MONONITRATE 60 MG: 60 TABLET, EXTENDED RELEASE ORAL at 09:10

## 2019-01-01 RX ADMIN — SUCRALFATE 1 G: 1 TABLET ORAL at 06:11

## 2019-01-01 ASSESSMENT — PULMONARY FUNCTION TESTS
PIF_VALUE: 0

## 2019-01-01 ASSESSMENT — PAIN SCALES - GENERAL
PAINLEVEL_OUTOF10: 0
PAINLEVEL_OUTOF10: 8
PAINLEVEL_OUTOF10: 0
PAINLEVEL_OUTOF10: 7
PAINLEVEL_OUTOF10: 0
PAINLEVEL_OUTOF10: 0
PAINLEVEL_OUTOF10: 3
PAINLEVEL_OUTOF10: 0
PAINLEVEL_OUTOF10: 10
PAINLEVEL_OUTOF10: 0
PAINLEVEL_OUTOF10: 7
PAINLEVEL_OUTOF10: 0
PAINLEVEL_OUTOF10: 8
PAINLEVEL_OUTOF10: 0
PAINLEVEL_OUTOF10: 5
PAINLEVEL_OUTOF10: 0
PAINLEVEL_OUTOF10: 3
PAINLEVEL_OUTOF10: 3
PAINLEVEL_OUTOF10: 7
PAINLEVEL_OUTOF10: 3
PAINLEVEL_OUTOF10: 0
PAINLEVEL_OUTOF10: 0
PAINLEVEL_OUTOF10: 8

## 2019-01-01 ASSESSMENT — ENCOUNTER SYMPTOMS
BLOOD IN STOOL: 0
VOMITING: 0
ABDOMINAL PAIN: 0
ABDOMINAL PAIN: 0
COUGH: 0
SHORTNESS OF BREATH: 1
HEMATOCHEZIA: 0
BACK PAIN: 1
COUGH: 0
NAUSEA: 0
NAUSEA: 0
SHORTNESS OF BREATH: 1
BLOOD IN STOOL: 0
BACK PAIN: 0
VOMITING: 0
COUGH: 0
ORTHOPNEA: 1
BLOOD IN STOOL: 0
NAUSEA: 0
BACK PAIN: 0
ABDOMINAL PAIN: 0
DIARRHEA: 0
VOMITING: 0
WHEEZING: 0
SHORTNESS OF BREATH: 0

## 2019-01-01 ASSESSMENT — PAIN DESCRIPTION - FREQUENCY
FREQUENCY: CONTINUOUS
FREQUENCY: INTERMITTENT

## 2019-01-01 ASSESSMENT — PAIN DESCRIPTION - LOCATION
LOCATION: KNEE
LOCATION: KNEE
LOCATION: HEAD
LOCATION: CHEST
LOCATION: KNEE

## 2019-01-01 ASSESSMENT — PAIN DESCRIPTION - ORIENTATION
ORIENTATION: RIGHT;LEFT

## 2019-01-01 ASSESSMENT — PAIN - FUNCTIONAL ASSESSMENT
PAIN_FUNCTIONAL_ASSESSMENT: ACTIVITIES ARE NOT PREVENTED
PAIN_FUNCTIONAL_ASSESSMENT: PREVENTS OR INTERFERES SOME ACTIVE ACTIVITIES AND ADLS

## 2019-01-01 ASSESSMENT — PAIN DESCRIPTION - PROGRESSION
CLINICAL_PROGRESSION: NOT CHANGED
CLINICAL_PROGRESSION: GRADUALLY WORSENING
CLINICAL_PROGRESSION: NOT CHANGED
CLINICAL_PROGRESSION: NOT CHANGED

## 2019-01-01 ASSESSMENT — PAIN DESCRIPTION - DESCRIPTORS
DESCRIPTORS: ACHING;DISCOMFORT
DESCRIPTORS: ACHING;DISCOMFORT;SHARP
DESCRIPTORS: DISCOMFORT;HEADACHE
DESCRIPTORS: ACHING;DISCOMFORT;THROBBING

## 2019-01-01 ASSESSMENT — PAIN DESCRIPTION - ONSET
ONSET: ON-GOING
ONSET: GRADUAL
ONSET: ON-GOING
ONSET: GRADUAL

## 2019-01-01 ASSESSMENT — PAIN DESCRIPTION - PAIN TYPE
TYPE: CHRONIC PAIN
TYPE: ACUTE PAIN

## 2019-01-01 ASSESSMENT — PAIN DESCRIPTION - DIRECTION: RADIATING_TOWARDS: LEFT BREAST

## 2019-03-04 PROBLEM — K43.9 HERNIA OF ANTERIOR ABDOMINAL WALL: Status: ACTIVE | Noted: 2019-01-01

## 2019-03-22 PROBLEM — Z01.818 PREOPERATIVE CLEARANCE: Status: ACTIVE | Noted: 2019-01-01

## 2019-04-21 PROBLEM — Z01.818 PREOPERATIVE CLEARANCE: Status: RESOLVED | Noted: 2019-01-01 | Resolved: 2019-01-01

## 2019-05-17 PROBLEM — D64.9 ANEMIA: Status: ACTIVE | Noted: 2019-01-01

## 2019-05-17 NOTE — CONSULTS
GENERAL SURGERY  CONSULT NOTE  5/17/2019    Physician Consulted: Dr. Giuseppe Loza  Reason for Consult: GIB  Referring Physician: Dr. Derrell ESPINAL  Rai Riojas is a 80 y.o. female who presents for evaluation of GIB. She presented to the ER two days post cataract surgery with complaints of bilateral knee pain that was keeping her up at night. On basic lab draw she was found to be anemic with a Hgb of 7.1. Her baseline is 10-11. She does have darks BMs but does take iron supplements. She admits to heartburn and pains in her chest sometimes. She was tolerating a diet fine without issues and was having regular BMs. She last had EGD and c scope in 2014 which showed gastric ulcers. She had a previous hysterectomy and cholecystectomy. She is on Eliquis for afib and does take Ibuprofen but infrequently. Past Medical History:   Diagnosis Date    Diabetes mellitus ([address]     Hypertension     Hypothyroidism     Iron deficiency anemia        Past Surgical History:   Procedure Laterality Date    APPENDECTOMY      CHOLECYSTECTOMY      HIP SURGERY Right 03/03/2018    ORIF     TOTAL KNEE ARTHROPLASTY      VAGINA SURGERY  01/19/2016    replacement of pessary in vagina       Medications Prior to Admission:    Prior to Admission medications    Medication Sig Start Date End Date Taking?  Authorizing Provider   linagliptin (TRADJENTA) 5 MG tablet Take 5 mg by mouth every morning   Yes Historical Provider, MD   PREDNISOLON-MOXIFLOX-BROMFENAC OP Place 1 drop into the left eye 3 times daily 0.075%-0.5%-1%  Compounded Medication   Yes Historical Provider, MD   levothyroxine (SYNTHROID) 100 MCG tablet Take 1 tablet by mouth Daily  Patient taking differently: Take 100 mcg by mouth every morning  5/14/19  Yes Loren Bear DO   isosorbide mononitrate (IMDUR) 60 MG extended release tablet Take 1 tablet by mouth daily  Patient taking differently: Take 60 mg by mouth every morning  3/15/19  Yes Loren Bear DO   polyethyl glycol-propyl

## 2019-05-17 NOTE — ED PROVIDER NOTES
60-year-old female history congestive heart failure, A. yossi on Eliquis, chronic kidney disease presents emergency Department w bilateral knee pain worsening over the last two days. Patient states that she had a cataract surgery 2 days ago and has had worsening bilateral knee pain since the procedure. Per the patient's family was with her states that she's been staying with him since the surgery has been consistently getting up and moving around because she states that she only thing that makes her pain better. Patient is to be taking 50 mg of tramadol as needed for pain the family states they aren't sure if she is taking this as prescribed. Patient states overall she is generally tired and fatigued but also states she has not slept much the last 2 days because of the pain in her knees. She states no other complaints at this time clearing cough abdominal pain nausea vomiting diarrhea or problems urinating    The history is provided by the patient. Fatigue   Severity:  Moderate  Onset quality:  Gradual  Duration:  2 days  Timing:  Intermittent  Progression:  Waxing and waning  Chronicity:  New  Relieved by:  Nothing  Worsened by:  Nothing  Ineffective treatments:  None tried  Associated symptoms: arthralgias    Associated symptoms: no abdominal pain, no anorexia, no aphasia, no ataxia, no chest pain, no cough, no diarrhea, no difficulty walking, no dizziness, no drooling, no dysphagia, no dysuria, no numbness in extremities, no falls, no fever, no foul-smelling urine, no frequency, no headaches, no hematochezia, no lethargy, no melena, no myalgias, no nausea, no sensory-motor deficit, no shortness of breath, no syncope, no urgency and no vomiting    Risk factors: congestive heart failure        Review of Systems   Constitutional: Positive for fatigue. Negative for chills and fever. HENT: Negative for drooling. Respiratory: Negative for cough and shortness of breath.     Cardiovascular: Negative for chest covering for Dr. Zaida Cook who will admit the patient    [CF]      ED Course User Index  [CF] Luna Rivas, DO     --------------------------------------------- PAST HISTORY ---------------------------------------------  Past Medical History:  has a past medical history of Diabetes mellitus ([facility] Hypertension, Hypothyroidism, and Iron deficiency anemia. Past Surgical History:  has a past surgical history that includes Total knee arthroplasty; Cholecystectomy; Appendectomy; hip surgery (Right, 03/03/2018); and Vagina surgery (01/19/2016). Social History:  reports that she has never smoked. She has never used smokeless tobacco. She reports that she does not drink alcohol or use drugs. Family History: family history is not on file. The patients home medications have been reviewed.     Allergies: Asa [aspirin]    -------------------------------------------------- RESULTS -------------------------------------------------    LABS:  Results for orders placed or performed during the hospital encounter of 05/17/19   CBC Auto Differential   Result Value Ref Range    WBC 12.7 (H) 4.5 - 11.5 E9/L    RBC 2.30 (L) 3.50 - 5.50 E12/L    Hemoglobin 7.1 (L) 11.5 - 15.5 g/dL    Hematocrit 22.4 (L) 34.0 - 48.0 %    MCV 97.4 80.0 - 99.9 fL    MCH 30.9 26.0 - 35.0 pg    MCHC 31.7 (L) 32.0 - 34.5 %    RDW 14.9 11.5 - 15.0 fL    Platelets 038 372 - 144 E9/L    MPV 11.2 7.0 - 12.0 fL    Neutrophils % 80.9 (H) 43.0 - 80.0 %    Immature Granulocytes % 0.8 0.0 - 5.0 %    Lymphocytes % 10.1 (L) 20.0 - 42.0 %    Monocytes % 6.8 2.0 - 12.0 %    Eosinophils % 0.9 0.0 - 6.0 %    Basophils % 0.5 0.0 - 2.0 %    Neutrophils # 10.30 (H) 1.80 - 7.30 E9/L    Immature Granulocytes # 0.10 E9/L    Lymphocytes # 1.28 (L) 1.50 - 4.00 E9/L    Monocytes # 0.86 0.10 - 0.95 E9/L    Eosinophils # 0.12 0.05 - 0.50 E9/L    Basophils # 0.07 0.00 - 0.20 R5/V   Basic Metabolic Panel   Result Value Ref Range    Sodium 136 132 - 146 mmol/L    Potassium 4.1

## 2019-05-17 NOTE — ANESTHESIA PRE PROCEDURE
FE) MG tablet Take 325 mg by mouth 2 times daily   Yes Historical Provider, MD       Current medications:    Current Facility-Administered Medications   Medication Dose Route Frequency Provider Last Rate Last Dose    sodium chloride flush 0.9 % injection 10 mL  10 mL Intravenous PRN [place] Elodia, DO   10 mL at 05/17/19 0829    sodium chloride flush 0.9 % injection 10 mL  10 mL Intravenous 2 times per day [place] Elodia, DO        acetaminophen (TYLENOL) tablet 650 mg  650 mg Oral Q4H PRN Saulo Phama, DO        isosorbide mononitrate (IMDUR) extended release tablet 60 mg  60 mg Oral Daily Laura Purcell MD        traZODone (DESYREL) tablet 50 mg  50 mg Oral Nightly Laura Purcell MD        linagliptin [facility]) tablet 5 mg  5 mg Oral Daily Laura Purcell MD        amLODIPine (NORVASC) tablet 5 mg  5 mg Oral BID Laura Purcell MD        ferrous sulfate tablet 325 mg  325 mg Oral BID WC Laura Purcell MD        antioxidant multivitamin (OCUVITE) tablet  1 tablet Oral Daily Laura Purcell MD        levothyroxine (SYNTHROID) tablet 100 mcg  100 mcg Oral Daily Laura Purcell MD        furosemide (LASIX) tablet 20 mg  20 mg Oral Daily Laura Purcell MD        pantoprazole (PROTONIX) injection 40 mg  40 mg Intravenous BID Angeline Summers DO        And    sodium chloride (PF) 0.9 % injection 10 mL  10 mL Intravenous BID Angeline Summers DO        traMADol Marlea Christin) tablet 50 mg  50 mg Oral Q6H PRN Angeline Summers DO        dextrose 5 % and 0.9 % sodium chloride infusion   Intravenous Continuous Laura Purcell MD           Allergies:     Allergies   Allergen Reactions    Asa [Aspirin] Other (See Comments)      causesGI Bleeding per patient       Problem List:    Patient Active Problem List   Diagnosis Code    Hypertension I10    Hypothyroidism E03.9    Acute on chronic combined systolic and diastolic CHF (congestive heart failure) ([facility] [address] I50.43    New onset atrial fibrillation ([facility] I48.91    Anticoagulation management encounter Z51.81, Z79.01    Congestive heart failure (HCC) I50.9    CAD (coronary artery disease) I25.10    Closed disp intertrochanteric fracture of right femur with malunion S72.141P    Acute respiratory failure ([facility] J96.00    Hypoxia R09.02    Type 2 diabetes mellitus without complication, without long-term current use of insulin (HCC) E11.9    Benign essential HTN I10    Chronic atrial fibrillation (HCC) I48.2    Other specified hypothyroidism E03.8    Chronic kidney disease (CKD) stage G3a/A1, moderately decreased glomerular filtration rate (GFR) between 45-59 mL/min/1.73 square meter and albuminuria creatinine ratio less than 30 mg/g ([facility]) N18.3    Chronic anticoagulation Z79.01    Primary insomnia F51.01    Hernia of anterior abdominal wall K43.9    Anemia D64.9       Past Medical History:        Diagnosis Date    [facility]     Hypertension     Hypothyroidism     Iron deficiency anemia        Past Surgical History:        Procedure Laterality Date    APPENDECTOMY      CHOLECYSTECTOMY      HIP SURGERY Right 03/03/2018    ORIF     TOTAL KNEE ARTHROPLASTY      VAGINA SURGERY  01/19/2016    replacement of pessary in vagina       Social History:    Social History     Tobacco Use    Smoking status: Never Smoker    Smokeless tobacco: Never Used   Substance Use Topics    Alcohol use:  No                                Counseling given: Not Answered      Vital Signs (Current):   Vitals:    05/17/19 0530 05/17/19 0810 05/17/19 1016 05/17/19 1200   BP: (!) 128/48 (!) 122/58 (!) 127/56 (!) 140/60   Pulse:  67 65 61   Resp:  16 16 18   Temp:   98.2 °F (36.8 °C) 97.4 °F (36.3 °C)   TempSrc:   Oral Oral   SpO2:  96% 96% 96%   Weight:       Height:                                                  BP Readings from Last 3 Encounters:   05/17/19 (!) 140/60   03/22/19 115/68   03/04/19 124/78       NPO Status: BMI:   Wt Readings from Last 3 Encounters:   05/17/19 113 lb (51.3 kg)   03/22/19 113 lb (51.3 kg)   03/04/19 115 lb 6.4 oz (52.3 kg)     Body mass index is 25.33 kg/m². CBC:   Lab Results   Component Value Date    WBC 12.7 05/17/2019    RBC 2.30 05/17/2019    HGB 7.1 05/17/2019    HCT 22.4 05/17/2019    MCV 97.4 05/17/2019    RDW 14.9 05/17/2019     05/17/2019       CMP:   Lab Results   Component Value Date     05/17/2019    K 4.1 05/17/2019    K 3.9 05/04/2018     05/17/2019    CO2 19 05/17/2019    BUN 84 05/17/2019    CREATININE 1.5 05/17/2019    GFRAA 39 05/17/2019    LABGLOM 32 05/17/2019    GLUCOSE 290 05/17/2019    PROT 7.5 03/04/2019    CALCIUM 9.5 05/17/2019    BILITOT 0.7 03/04/2019    ALKPHOS 127 03/04/2019    AST 18 03/04/2019    ALT 12 03/04/2019       POC Tests: No results for input(s): POCGLU, POCNA, POCK, POCCL, POCBUN, POCHEMO, POCHCT in the last 72 hours.     Coags:   Lab Results   Component Value Date    PROTIME 11.2 08/09/2017    INR 1.0 08/09/2017    APTT 27.1 08/09/2017       HCG (If Applicable): No results found for: PREGTESTUR, PREGSERUM, HCG, HCGQUANT     ABGs: No results found for: PHART, PO2ART, RVF6RDC, AFZ4YUQ, BEART, Q1QOBEMF     Type & Screen (If Applicable):  No results found for: LABABO, [address]    Anesthesia Evaluation  Patient summary reviewed no history of anesthetic complications:   Airway: Mallampati: II  TM distance: >3 FB   Neck ROM: full   Dental:      Comment: Upper partial    Pulmonary:Negative Pulmonary ROS breath sounds clear to auscultation                             Cardiovascular:  Exercise tolerance: poor (<4 METS),   (+) hypertension:, valvular problems/murmurs: MR, CAD:, dysrhythmias: atrial fibrillation, CHF: diastolic and systolic,         Rhythm: irregular  Rate: normal           Beta Blocker:  Not on Beta Blocker         Neuro/Psych:   Negative Neuro/Psych ROS GI/Hepatic/Renal:   (+) renal disease: CRI,          ROS comment: GIB. Endo/Other:    (+) DiabetesType II DM, , hypothyroidism, blood dyscrasia: anemia and anticoagulation therapy:., .                 Abdominal:           Vascular: negative vascular ROS. Anesthesia Plan      MAC     ASA 4       Induction: intravenous. Anesthetic plan and risks discussed with patient.                       Bharti Erickson MD   5/17/2019      Patient will need to be re-evaluated prior to surgery by DOS anesthesiologist.    Bharti Erickson MD           5/17/2019        2:56 PM

## 2019-05-18 NOTE — PLAN OF CARE
Problem: Falls - Risk of:  Goal: Will remain free from falls  Description  Will remain free from falls  5/18/2019 0005 by Daja Carrillo RN  Outcome: Met This Shift  5/17/2019 1941 by Falguni Roberts RN  Outcome: Met This Shift

## 2019-05-18 NOTE — OP NOTE
Endoscopic  Procedure Note    Procedure: Esophagogastroduodenoscopy     Pre-operative Diagnosis: Anemia    Post-operative Diagnosis: Hiatal hernia with associated gastritis    Sedation: MAC        Complications: None            Disposition: PACU - hemodynamically stable.            Condition: stable        Maame Romeo 5/18/2019 8:32 AM

## 2019-05-18 NOTE — ANESTHESIA POSTPROCEDURE EVALUATION
Department of Anesthesiology  Postprocedure Note    Patient: Yrn Caraballo  MRN: [id number]  YOB: 1925  Date of evaluation: 5/18/2019  Time:  10:05 AM     Procedure Summary     Date:  05/18/19 Room / Location:  [facility] OR 01 / [facility] OR    Anesthesia Start:  0820 Anesthesia Stop:  2381    Procedure:  EGD ESOPHAGOGASTRODUODENOSCOPY (N/A ) Diagnosis:  (-)    Surgeon:  Good Phipps MD Responsible Provider:  Olivia Soriano MD    Anesthesia Type:  MAC ASA Status:  4          Anesthesia Type: MAC    Asiya Phase I:      Asiya Phase II: Asiya Score: 7    Last vitals: Reviewed and per EMR flowsheets.        Anesthesia Post Evaluation    Patient location during evaluation: PACU  Patient participation: complete - patient participated  Level of consciousness: awake and alert  Airway patency: patent  Nausea & Vomiting: no vomiting and no nausea  Complications: no  Cardiovascular status: blood pressure returned to baseline  Respiratory status: acceptable  Hydration status: euvolemic

## 2019-05-19 NOTE — PROGRESS NOTES
[facility] with [facility]. Return call received. New orders placed.
Patient's consent form needs signed. Called poa, 2nd time, and left message on home and cell phones.
03/12/2017    MONOPCT 7.0 05/19/2019    BASOPCT 0.0 05/19/2019    MONOSABS 0.69 05/19/2019    LYMPHSABS 0.50 05/19/2019    EOSABS 0.00 05/19/2019    BASOSABS 0.00 05/19/2019     CMP:    Lab Results   Component Value Date     05/17/2019    K 4.1 05/17/2019    K 3.9 05/04/2018     05/17/2019    CO2 19 05/17/2019    BUN 84 05/17/2019    CREATININE 1.5 05/17/2019    GFRAA 39 05/17/2019    LABGLOM 32 05/17/2019    PROT 7.5 03/04/2019    LABALBU 3.9 03/04/2019    CALCIUM 9.5 05/17/2019    BILITOT 0.7 03/04/2019    ALKPHOS 127 03/04/2019    AST 18 03/04/2019    ALT 12 03/04/2019     PT/INR:    Lab Results   Component Value Date    PROTIME 11.2 08/09/2017    INR 1.0 08/09/2017       Assessment:     Active Problems:    Anemia  Resolved Problems:    * No resolved hospital problems. *  ACUTE ANEMIA--POSSIBLE GI BLEEED  ZAFAR  BY HX NIDDM/A. FIB--NOT ON ANTICOAG    Plan:   TRANSFUSE 2 UNITS PRBC'S  NEED COLONOSCOPY/?EGD---DR SINGH CONSULT

## 2019-05-20 NOTE — CARE COORDINATION
Introduced my self and provided explanation of CM role to patient. Patient is awake, alert, and aware of current diagnosis and treatment plan including iv fluids, gen surgery consult. Patient verbalizes no concerns and identifies no areas to focus on nor barriers to discharge. She states she does live alone, but mona Mena is nearby. In speaking with mona Mena over phone, [phone], patient was able function alone at home while he worked. He is concerned that she may not be able to sustain that level of function much longer and is interested in a formal pt/ot eval.  Orders for same received. If patient is eligible for snf, he would prefer [facility] (patient has been there before) or [facility]. Referral made to Keiko Melendez at [place]. Will await her review/acceptance along with pt/ot evals. Will follow along with  and assist with discharge planning as necessary. Drew Null.  Jeremie, MSN, RN  [facility] Case Management  [phone]

## 2019-05-20 NOTE — H&P
Lab Results   Component Value Date    WBC 9.9 05/20/2019    RBC 3.12 05/20/2019    HGB 9.6 05/20/2019    HCT 30.1 05/20/2019     05/20/2019    MCV 96.5 05/20/2019    MCH 30.8 05/20/2019    MCHC 31.9 05/20/2019    RDW 18.4 05/20/2019    NRBC 0.0 03/12/2017    SEGSPCT 80 02/21/2014    METASPCT 1 02/17/2014    LYMPHOPCT 5.0 05/19/2019    PROMYELOPCT 1.7 03/12/2017    MONOPCT 7.0 05/19/2019    BASOPCT 0.0 05/19/2019    MONOSABS 0.69 05/19/2019    LYMPHSABS 0.50 05/19/2019    EOSABS 0.00 05/19/2019    BASOSABS 0.00 05/19/2019     CMP:    Lab Results   Component Value Date     05/17/2019    K 4.1 05/17/2019    K 3.9 05/04/2018     05/17/2019    CO2 19 05/17/2019    BUN 84 05/17/2019    CREATININE 1.5 05/17/2019    GFRAA 39 05/17/2019    LABGLOM 32 05/17/2019    GLUCOSE 290 05/17/2019    PROT 7.5 03/04/2019    LABALBU 3.9 03/04/2019    CALCIUM 9.5 05/17/2019    BILITOT 0.7 03/04/2019    ALKPHOS 127 03/04/2019    AST 18 03/04/2019    ALT 12 03/04/2019          Assessment and Plan:    Patient Active Problem List   Diagnosis    UGIB     Blood loss anemia    Atrial fibrillation (HCC)    CAD (coronary artery disease)    Type 2 diabetes mellitus without complication, without long-term current use of insulin (HCC)    Benign essential HTN  CKD

## 2019-05-21 NOTE — DISCHARGE INSTR - COC
Continuity of Care Form    Patient Name: Nathaniel Pantoja   :  1925  MRN:  [id number]    Admit date:  2019  Discharge date:  2019    Code Status Order: Full Code   Advance Directives:   Advance Care Flowsheet Documentation     Date/Time Healthcare Directive Type of Healthcare Directive Copy in [address] Agent's Name Healthcare Agent's Phone Number    [phone]  Yes, patient has an advance directive for healthcare treatment  Durable power of  for health care  No, copy requested from family  [facility] power of   Adia Cleveland  [phone]          Admitting Physician:  Nicole Early DO  PCP: Nicole Early DO    Discharging Nurse: [facility] Unit/Room#: [facility] Unit Phone Number: [phone]    Emergency Contact:   Extended Emergency Contact Information  Primary Emergency Contact: George Davey  Address: [address] [place] [address], [facility] [address] [facility] Phone: [phone]  Mobile Phone: [phone]  Relation: Grandchild  Secondary Emergency Contact: An Beto [facility] Phone: [phone]  Mobile Phone: [phone]  Relation: Lay Caregiver    Past Surgical History:  Past Surgical History:   Procedure Laterality Date    APPENDECTOMY      CHOLECYSTECTOMY      HIP SURGERY Right 2018    ORIF     TOTAL KNEE ARTHROPLASTY      UPPER GASTROINTESTINAL ENDOSCOPY N/A 2019    EGD ESOPHAGOGASTRODUODENOSCOPY performed by Gordon Champion MD at [address]  2016    replacement of pessary in vagina       Immunization History:   Immunization History   Administered Date(s) Administered    Tdap (Boostrix, Adacel) 2016       Active Problems:  Patient Active Problem List   Diagnosis Code    Hypertension I10    Hypothyroidism E03.9    Acute on chronic combined systolic and diastolic CHF (congestive heart failure) ([address] I50.43    New onset atrial fibrillation ([address] I48.91    Anticoagulation management encounter Z51.81, Z79.01    Congestive heart failure (HCC) I50.9    CAD (coronary artery disease) I25.10    Closed disp intertrochanteric fracture of right femur with malunion S72.141P    Acute respiratory failure ([facility]) J96.00    Hypoxia R09.02    Type 2 diabetes mellitus without complication, without long-term current use of insulin (HCC) E11.9    Benign essential HTN I10    Chronic atrial fibrillation (HCC) I48.2    Other specified hypothyroidism E03.8    Chronic kidney disease (CKD) stage G3a/A1, moderately decreased glomerular filtration rate (GFR) between 45-59 mL/min/1.73 square meter and albuminuria creatinine ratio less than 30 mg/g ([facility]) N18.3    Chronic anticoagulation Z79.01    Primary insomnia F51.01    Hernia of anterior abdominal wall K43.9    Anemia D64.9       Isolation/Infection:   Isolation          No Isolation            Nurse Assessment:  Last Vital Signs: /61   Pulse 55   Temp 97.7 °F (36.5 °C) (Oral)   Resp 18   Ht 4' 8\" (1.422 m)   Wt 116 lb 7 oz (52.8 kg)   SpO2 93%   BMI 26.10 kg/m²     Last documented pain score (0-10 scale): Pain Level: 0  Last Weight:   Wt Readings from Last 1 Encounters:   05/21/19 116 lb 7 oz (52.8 kg)     Mental Status:  oriented    IV Access:  - None    Nursing Mobility/ADLs:  Walking   Assisted  Transfer  Assisted  Bathing  Assisted  Dressing  Assisted  Toileting  Assisted  Feeding  Independent  Med Admin  Assisted  Med Delivery   whole    Wound Care Documentation and Therapy:        Elimination:  Continence:   · Bowel: Yes  · Bladder: Yes  Urinary Catheter: None   Colostomy/Ileostomy/Ileal Conduit: No       Date of Last BM: ***    Intake/Output Summary (Last 24 hours) at 5/21/2019 1135  Last data filed at 5/21/2019 0947  Gross per 24 hour   Intake 0 ml   Output --   Net 0 ml     No intake/output data recorded. Safety Concerns:      At Risk for Falls    Impairments/Disabilities: None    Nutrition Therapy:  Current Nutrition Therapy:   - Oral Diet:  GERD    Routes of Feeding: Oral  Liquids: Thin Liquids  Daily Fluid Restriction: no  Last Modified Barium Swallow with Video (Video Swallowing Test): not done    Treatments at the Time of Hospital Discharge:   Respiratory Treatments: ***  Oxygen Therapy:  is on oxygen at 3 L/min per nasal cannula. Ventilator:    - No ventilator support    Rehab Therapies: Physical Therapy and Occupational Therapy  Weight Bearing Status/Restrictions: No weight bearing restirctions  Other Medical Equipment (for information only, NOT a DME order):  walker  Other Treatments: ***    Patient's personal belongings (please select all that are sent with patient):  None    RN SIGNATURE:  {Esignature:[id number]} Maxine Hummel RN     CASE MANAGEMENT/SOCIAL WORK SECTION    Inpatient Status Date: ***    Readmission Risk Assessment Score:  Readmission Risk              Risk of Unplanned Readmission:        13           Discharging to Facility/ Agency   · Name: [facility]   · Address:  · Phone:  · Fax:    Dialysis Facility (if applicable)   · Name:  · Address:  · Dialysis Schedule:  · Phone:  · Fax:    / signature: Electronically signed by PATO Benitez on 5/21/2019 at 11:35 AM      PHYSICIAN SECTION    Prognosis: Fair    Condition at Discharge: Stable    Rehab Potential (if transferring to Rehab): Fair    Recommended Labs or Other Treatments After Discharge: ***    Physician Certification: I certify the above information and transfer of Salvatore Roberts  is necessary for the continuing treatment of the diagnosis listed and that she requires [facility] for less 30 days.      Update Admission H&P: No change in H&P     PHYSICIAN SIGNATURE:  Loren Bear DO

## 2019-05-22 NOTE — CARE COORDINATION
Social Work/Discharge Planning:  Discharge order noted. Patient to discharge to [facility] at SNF LOC. Called [facility] and arranged [facility] transport for 11:30am.  Notified patient mona Jean ([phone]) of discharge time via voicemail. Notified RN and liaison Renetta Pedraza from [facility] of discharge time. Electronically signed by PATO Mclaughlin on 5/22/2019 at 10:39 AM    Addendum:  Renetta Pedraza from [facility] facility at 12:40pm.  Notified RN.   Electronically signed by PATO Mclaughlin on 5/22/2019 at 10:43 AM

## 2019-05-30 NOTE — ED NOTES
Nurse to nurse to Juan Kimbrough at [facility].      Harish Mcpherson RN  05/30/19 7640

## 2019-05-30 NOTE — ED PROVIDER NOTES
51-year-old female with multiple comorbidities presents to the ED with chief complaint of chest pain and shortness of breath this started earlier today. Patient states she was sitting in a chair and also and she had chest pain substernal and epigastric region that radiated up to her left shoulder. States it was more of a discover that she cannot describe associated with this shortness of breath. Patient states the chest pain lasted about 30 minutes. Shes never had this type of pain before. Denies nausea or lightheadedness. She was recently admitted 2 weeks ago for low hemoglobin. The history is provided by the patient. Chest Pain   Pain location:  Substernal area  Pain quality: aching    Pain radiates to:  L shoulder  Pain severity:  Mild  Onset quality:  Sudden  Duration:  30 minutes  Timing:  Constant  Progression:  Resolved  Chronicity:  New  Context: at rest    Context: not breathing, not eating, not lifting, not movement and not raising an arm    Relieved by:  Nothing  Worsened by:  Nothing  Ineffective treatments:  None tried  Associated symptoms: back pain, orthopnea, shortness of breath and weakness    Associated symptoms: no abdominal pain, no anxiety, no cough, no dizziness, no fever, no headache, no nausea, no near-syncope, no numbness, no palpitations and no vomiting    Risk factors: coronary artery disease, diabetes mellitus and hypertension    Risk factors: no prior DVT/PE        Review of Systems   Constitutional: Negative for fever. Respiratory: Positive for shortness of breath. Negative for cough and wheezing. Cardiovascular: Positive for chest pain and orthopnea. Negative for palpitations, leg swelling and near-syncope. Gastrointestinal: Negative for abdominal pain, blood in stool, nausea and vomiting. Genitourinary: Negative for dysuria, flank pain and hematuria. Musculoskeletal: Positive for back pain. Neurological: Positive for weakness.  Negative for dizziness, numbness and headaches. Physical Exam   Constitutional: She is oriented to person, place, and time. She appears well-developed and well-nourished. No distress. HENT:   Head: Normocephalic and atraumatic. Eyes: Pupils are equal, round, and reactive to light. Neck: Normal range of motion. Neck supple. Cardiovascular: Normal rate and intact distal pulses. No murmur heard. Irregularly irregular rhythm   Pulmonary/Chest: Effort normal. No respiratory distress. She has no wheezes. She has rales. Abdominal: Soft. Bowel sounds are normal. There is no tenderness. There is no rebound and no guarding. Musculoskeletal: She exhibits edema (2+ b/l LEs). She exhibits no tenderness. Neurological: She is alert and oriented to person, place, and time. Skin: Skin is warm and dry. Capillary refill takes less than 2 seconds. She is not diaphoretic. Nursing note and vitals reviewed. Procedures  ED Course as of May 31 0130   Thu May 30, 2019   1608 Patient presents with chest pain lasting about 30 minutes and felt more like discomfort. Patient has multiple comorbidities. Recently admitted for low hemoglobin found to have peptic ulcers. This pain is different from that pain. [BM]   3249 Discussed with patient the plan for admission for observation and chest pain rule out. However, patient says she does not want stay in the hospital and wants to leave right now. She does not care about any of the consequences or risks. Risks were discussed including death. Patient says she does not want to be resuscitated. She wants to be DNR code status. She does not want intubation or life saving medications or CPR if her heart were to stop. She just wants to go back home. Discussed this with her PCP who is informed and will see her as an outpatient. Discussed with patient that if things get worse of course she is encouraged to come back if she wants to.      [BM]      ED Course User Index  [BM] [place] Bolus, DO --------------------------------------------- PAST HISTORY ---------------------------------------------  Past Medical History:  has a past medical history of Acute gastrointestinal hemorrhage, Anemia, Atrial fibrillation ([facility] [address] Closed 2-part intertrochanteric fracture of proximal end of femur with routine healing, right, Coronary atherosclerosis of native coronary artery, Depression, Diabetes mellitus ([facility] [address] Dysphagia, GERD (gastroesophageal reflux disease), Heart failure ([facility] Hemiplegia and hemiparesis following cerebral infarction affecting unspecified side ([facility] Hypertension, Hypothyroidism, and Iron deficiency anemia. Past Surgical History:  has a past surgical history that includes Total knee arthroplasty; Cholecystectomy; Appendectomy; hip surgery (Right, 03/03/2018); Vagina surgery (01/19/2016); Upper gastrointestinal endoscopy (N/A, 5/18/2019); and joint replacement. Social History:  reports that she has never smoked. She has never used smokeless tobacco. She reports that she does not drink alcohol or use drugs. Family History: family history is not on file. The patients home medications have been reviewed.     Allergies: Asa [aspirin]    -------------------------------------------------- RESULTS -------------------------------------------------    LABS:  Results for orders placed or performed during the hospital encounter of 05/30/19   CBC auto differential   Result Value Ref Range    WBC 11.1 4.5 - 11.5 E9/L    RBC 3.35 (L) 3.50 - 5.50 E12/L    Hemoglobin 10.2 (L) 11.5 - 15.5 g/dL    Hematocrit 32.2 (L) 34.0 - 48.0 %    MCV 96.1 80.0 - 99.9 fL    MCH 30.4 26.0 - 35.0 pg    MCHC 31.7 (L) 32.0 - 34.5 %    RDW 15.3 (H) 11.5 - 15.0 fL    Platelets 253 077 - 953 E9/L    MPV 10.3 7.0 - 12.0 fL   Comprehensive Metabolic Panel   Result Value Ref Range    Sodium 134 132 - 146 mmol/L    Potassium 3.8 3.5 - 5.0 mmol/L    Chloride 100 98 - 107 mmol/L    CO2 20 (L) 22 - 29 mmol/L    Anion Gap 14 7 - 16 mmol/L    Glucose 410 (H) 74 - 99 mg/dL    BUN 26 (H) 8 - 23 mg/dL    CREATININE 1.5 (H) 0.5 - 1.0 mg/dL    GFR Non-African American 32 >=60 mL/min/1.73    GFR African American 39     Calcium 9.1 8.6 - 10.2 mg/dL    Total Protein 6.0 (L) 6.4 - 8.3 g/dL    Alb 3.1 (L) 3.5 - 5.2 g/dL    Total Bilirubin 0.3 0.0 - 1.2 mg/dL    Alkaline Phosphatase 142 (H) 35 - 104 U/L    ALT 18 0 - 32 U/L    AST 26 0 - 31 U/L   Magnesium   Result Value Ref Range    Magnesium 2.1 1.6 - 2.6 mg/dL   Troponin   Result Value Ref Range    Troponin 0.04 (H) 0.00 - 0.03 ng/mL   Brain Natriuretic Peptide   Result Value Ref Range    Pro-BNP 3,325 (H) 0 - 450 pg/mL   Lactic Acid, Plasma   Result Value Ref Range    Lactic Acid 2.5 (H) 0.5 - 2.2 mmol/L   Lipase   Result Value Ref Range    Lipase 22 13 - 60 U/L   EKG 12 Lead   Result Value Ref Range    Ventricular Rate 61 BPM    Atrial Rate 45 BPM    QRS Duration 68 ms    Q-T Interval 396 ms    QTc Calculation (Bazett) 398 ms    R Axis 25 degrees    T Axis -38 degrees       RADIOLOGY:  XR CHEST PORTABLE   Final Result      Cardiomegaly which appears to be stable      No evidence of airspace consolidation pulmonary venous congestion      Large hiatal hernia           ------------------------- NURSING NOTES AND VITALS REVIEWED ---------------------------  Date / Time Roomed:  5/30/2019  2:10 PM  ED Bed Assignment:  22/22    The nursing notes within the ED encounter and vital signs as below have been reviewed.      Patient Vitals for the past 24 hrs:   BP Temp Temp src Pulse Resp SpO2   05/30/19 1545 139/64 -- -- 50 -- 96 %   05/30/19 1423 (!) 137/57 98.2 °F (36.8 °C) Oral 59 16 95 %       Oxygen Saturation Interpretation: Normal    ------------------------------------------ PROGRESS NOTES ------------------------------------------  Re-evaluation(s):  See ED course    Counseling:  I have spoken with the patient and discussed todays results, in addition to providing specific details for the plan

## 2019-05-30 NOTE — ED NOTES
[facility] contacted for transport, will be here shortly.      Shelia Ferris RN  05/30/19 0764

## 2019-05-30 NOTE — CARE COORDINATION
5/30/2019 Introduced myself to patient and described my role as a . The patient was recently discharged on 5/22/2019 to [facility]  SNF. The patient stated that she did not feel that the rehab was good at [facility]. Her goal is to return home to her home in [place]. The patient's son lives in [place]  She relies on good friends for transportation and errands. She continues to use a wheelchair with the goal to ambulate with a walker. She sees Dr Fabian Kelly as her PCP. She uses [facility] [address]  For her prescriptions.  (PS)

## 2019-06-10 NOTE — PROGRESS NOTES
Pt complaining of pain in her left arm. IV in left arm infiltrated so it was removed. Pt refused to have another IV put in at this time. Pt also refused IV potassium. Pt stated she does not want anything.        Electronically signed by Hernan Herrera RN on 6/10/2019 at 10:39 AM

## 2019-06-10 NOTE — CONSULTS
following commands      Imagin/10 CXR:   FINDINGS:    Heart enlarged. There is new opacity in the right hemithorax which may  relate to airspace disease and/or layering effusion. Pulmonary  vascularity appears mildly congested.        Impression:       Cardiomegaly and pulmonary vascular congestion. Suspect developing  airspace disease on the right possibly with adjacent layering  effusion. Impression  Active Problems:    * No active hospital problems. *  Resolved Problems:    * No resolved hospital problems. *      Assessment:   Elaine Duckworth is a 80 y.o. female with acute on chronic CHF exacerbation who presented with worsening SOB. She was placed on BiPAP and started on lasix. Cardiology following- prognosis guarded. Pt wants no aggressive resusciation- DNRCC. Plan:   1. Acute on chronic CHF- Cardiology is following; continue lasix; monitor labs and clinical status; Echo result noted  2. [address]  3. She feels no need for medication for sob at this time. If she does need, would due morphine 2.5 mg q6 prn breathlessness        Palliative Medicine    Time in minutes: 50    More than 50% of this interaction was related to counseling and information given r/t disease process; plan of care; and code status. Discussed patient and the plan of care with the other IDT members of Palliative Care Team as well as with patient, family and staff nurse.      Thank you for allowing Palliative Medicine to participate in this patient's care.

## 2019-06-10 NOTE — CONSULTS
Consults       The above documentation has been prepared under my direction and personally reviewed by me in its entirety. I confirm that the note above accurately reflects all work, treatment, procedures, and medical decision making performed by me. The patient's history was independently obtained. The patient was independently examined. Electrocardiogram, prior and present cardiovascular assessment, and laboratory studies were reviewed. Patient is a 72-year-old white female known to [facility] cardiology with primary cardiovascular care provided by [facility]ell[facility]. 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 Time of most recent objective assessment and echocardiogram of March, 2017 demonstrated evidence of moderate concentric left ventricular hypertrophy with mild left ventricular systolic dysfunction estimated ejection fraction of 50% with stage I diastolic dysfunction and mild mitral regurgitation. He presently has been residing in the extended care facility and on the day of assessment in discussion with home caregivers, at the time of their evaluation, increasing dyspnea and edema were noted associated with that of hypoxia. At the time of her emergency room evaluation, electrocardiogram demonstrated evidence of atrial fibrillation with acceptable rate control with low voltage within the limb leads, delayed precordial transition zone and nonspecific ST changes and a chest x-ray demonstrated evidence of cardiomegaly with interstitial infiltrates as well as that of right pleural effusion.   proBNP level of 3485 pg/mL exceeded that of her baseline with an equivocal

## 2019-06-10 NOTE — ED PROVIDER NOTES
Genny Mcdonald is a 42-year-old female with a PMH of CHF, atrial fibrillation who presents to the ED for complaint of shortness of breath. Patient is chronically short of breath normally on 4 L of oxygen and developed worsening shortness of breath tonight. She was found to be satting in the high 80s on her normal oxygen requirements. She states that she has been having a cough, congestion for several days. She denies chest pain, fever, chills, nausea or vomiting. Review of Systems   Constitutional: Negative for chills and fever. Respiratory: Positive for shortness of breath. Negative for cough. Cardiovascular: Negative for chest pain. Gastrointestinal: Negative for abdominal pain, blood in stool, nausea and vomiting. Genitourinary: Negative for dysuria and frequency. Musculoskeletal: Negative for back pain. Skin: Negative for rash. Neurological: Negative for weakness and headaches. All other systems reviewed and are negative. Physical Exam   Constitutional: She is oriented to person, place, and time. She appears well-developed and well-nourished. HENT:   Head: Normocephalic and atraumatic. Eyes: Pupils are equal, round, and reactive to light. Neck: Normal range of motion. Neck supple. Cardiovascular: Normal rate and regular rhythm. Pulmonary/Chest: Tachypnea noted. She is in respiratory distress. She has no wheezes. She has no rales. Bibasilar rales   Abdominal: Soft. Bowel sounds are normal. There is no tenderness. There is no rebound and no guarding. Musculoskeletal:   2+ LE edema   Neurological: She is alert and oriented to person, place, and time. No cranial nerve deficit. Coordination normal.   Skin: Skin is warm and dry. Nursing note and vitals reviewed.       Procedures    MDM  Number of Diagnoses or Management Options  Acute respiratory failure with hypoxia [facility]):   Congestive heart failure, unspecified HF chronicity, unspecified heart failure type [facility]): replacement. Social History:  reports that she has never smoked. She has never used smokeless tobacco. She reports that she does not drink alcohol or use drugs. Family History: family history is not on file. The patients home medications have been reviewed.     Allergies: Asa [aspirin]    -------------------------------------------------- RESULTS -------------------------------------------------    LABS:  Results for orders placed or performed during the hospital encounter of 06/10/19   Troponin   Result Value Ref Range    Troponin 0.06 (H) 0.00 - 0.03 ng/mL   Brain Natriuretic Peptide   Result Value Ref Range    Pro-BNP 3,483 (H) 0 - 450 pg/mL   CBC Auto Differential   Result Value Ref Range    WBC 13.9 (H) 4.5 - 11.5 E9/L    RBC 3.52 3.50 - 5.50 E12/L    Hemoglobin 10.5 (L) 11.5 - 15.5 g/dL    Hematocrit 33.4 (L) 34.0 - 48.0 %    MCV 94.9 80.0 - 99.9 fL    MCH 29.8 26.0 - 35.0 pg    MCHC 31.4 (L) 32.0 - 34.5 %    RDW 14.3 11.5 - 15.0 fL    Platelets 292 785 - 656 E9/L    MPV 10.3 7.0 - 12.0 fL    Neutrophils % 92.1 (H) 43.0 - 80.0 %    Immature Granulocytes % 0.5 0.0 - 5.0 %    Lymphocytes % 2.6 (L) 20.0 - 42.0 %    Monocytes % 3.2 2.0 - 12.0 %    Eosinophils % 1.2 0.0 - 6.0 %    Basophils % 0.4 0.0 - 2.0 %    Neutrophils # 12.79 (H) 1.80 - 7.30 E9/L    Immature Granulocytes # 0.07 E9/L    Lymphocytes # 0.36 (L) 1.50 - 4.00 E9/L    Monocytes # 0.44 0.10 - 0.95 E9/L    Eosinophils # 0.16 0.05 - 0.50 E9/L    Basophils # 0.05 0.00 - 0.20 O2/Q   Basic Metabolic Panel   Result Value Ref Range    Sodium 138 132 - 146 mmol/L    Potassium 3.2 (L) 3.5 - 5.0 mmol/L    Chloride 102 98 - 107 mmol/L    CO2 25 22 - 29 mmol/L    Anion Gap 11 7 - 16 mmol/L    Glucose 144 (H) 74 - 99 mg/dL    BUN 24 (H) 8 - 23 mg/dL    CREATININE 1.3 (H) 0.5 - 1.0 mg/dL    GFR Non-African American 38 >=60 mL/min/1.73    GFR African American 46     Calcium 9.2 8.6 - 10.2 mg/dL   Protime-INR   Result Value Ref Range    Protime 12.2 9.3 - making and emergency management    This patient has remained hemodynamically stable during their ED course. Diagnosis:  1. Congestive heart failure, unspecified HF chronicity, unspecified heart failure type ([address]    2. Acute respiratory failure with hypoxia (HCC)    3. Elevated troponin        Disposition:  Patient's disposition: Admit to med/surg floor  Patient's condition is stable.          Adriana Madrigal DO  Resident  06/10/19 7171

## 2019-06-10 NOTE — DISCHARGE INSTR - COC
Continuity of Care Form    Patient Name: Mile Guerra   :  1925  MRN:  [id number]    Admit date:  6/10/2019  Discharge date:  19    Code Status Order: [facility]   Advance Directives:   [facility] Documentation     Date/Time Healthcare Directive Type of Healthcare Directive Copy in [address] Agent's Name Healthcare Agent's Phone Number    06/10/19 1635  Yes, patient has an advance directive for healthcare treatment  Durable power of  for health care  No, copy requested from family  Healthcare power of   [facility]   [phone]          Admitting Physician:  [id number], DO  PCP: [id number], DO    Discharging Nurse: [address] Unit/Room#: [address] [place] [address] Unit Phone Number: [phone]    Emergency Contact:   Extended Emergency Contact Information  Primary Emergency Contact: George Davey  Address: [address] [place] [address], [facility] [facility]  Home Phone: [phone]  Mobile Phone: [phone]  Relation: Grandchild  Secondary Emergency Contact: Josiane Beto [address] Phone: [phone]  Mobile Phone: [phone]  Relation: Lay Caregiver    Past Surgical History:  Past Surgical History:   Procedure Laterality Date    APPENDECTOMY      CHOLECYSTECTOMY      HIP SURGERY Right 2018    ORIF     JOINT REPLACEMENT      TOTAL KNEE ARTHROPLASTY      UPPER GASTROINTESTINAL ENDOSCOPY N/A 2019    EGD ESOPHAGOGASTRODUODENOSCOPY performed by Jesse Joans MD at [address]   2016    replacement of pessary in vagina       Immunization History:   Immunization History   Administered Date(s) Administered    Tdap (Boostrix, Adacel) 2016       Active Problems:  Patient Active Problem List   Diagnosis Code    Hypertension I10    Hypothyroidism E03.9    Acute on chronic combined systolic and diastolic CHF (congestive heart failure) ([facility] [address] I50.43    New onset atrial

## 2019-06-10 NOTE — CONSULTS
colonoscopy 2014: Normal.  7. Anemia on iron supplementation. [address] Admission, 03/11/2017 with dyspnea, orthopnea and cough.  BP: 203/99.  CXR showed CHF. Pro BNP 77389, troponin 0.02.  EKG showed sinus with nonspecific ST-T changes.  Improved with 40 of Lasix IV in ER.  New onset AF. 9. Echocardiogram, 03/12/2017, moderate concentric LVH, EF 45-55%. Diffuse hypokinesis.  Elevated LA pressure.  Mild MR.  No pericardial effusion. Mild AI.    10. Elevated CVA risk ([id number] score = 6).  Discharged on apixaban, 03/2017.   11. Renal insufficiency. BUN 40. Creatinine 1.5, 03/14/2017. 12. [facility] for abnormal labs 8/9/2017-8/12/2017: H/H 6.3/21.3, Plt 546, WBC 10.3, Bun/Cr 32/1.9. EKG SR. /60 afebrile. Admitted with GIB with a consult to surgery, patient refused endoscopy. Given IV PPI and transfused 2 units PRBC. [address] held during admission but re-ordered upon discharge. H/H 9.1/28.9, Bu/Cr  21/0.8 8/12/2017 . 13. [facility] 3/2/2018-3/6/2018 Fall>>laid on basement floor for 14 hours. 14. R hip fracture s/p IM nail R hip 3/3/2018. 15. Admitted 05/03/18 with dyspnea. ProBNP 2238. diuresed with improvement. 16. LabsBUN =21. Creatinine= 1.2 K=3.9. 17. Admitted on 5/17/2019 with complaints of Bilateral knee pain. She had bilateral cataract surgery 2 days PTA. +FOBT. EGD (5/18/2019) showing Hiatal Hernia associated with gastritis. On discharge Eliquis ws not resumed. She was started on PPI. 18. Presented to [facility] 5/30/2019 with complaints of Epigastric CP with radiating pain to the left shoulder, lasting 30 minutes with worsening SOB. Patient wanted to be discharged with no admission. Code status changed to [facility]. Medications Prior to admit:  Prior to Admission medications    Medication Sig Start Date End Date Taking?  Authorizing Provider   furosemide (LASIX) 20 MG tablet Take 20 mg by mouth every morning  5/27/19  Yes Historical Provider, MD   amLODIPine (NORVASC) 5 MG tablet Take 5 mg by mouth jugular venous distention. No carotid bruit. CHEST: Chest symmetrical and non-tender to palpation. No accessory muscle use or intercostal retractions  RESPIRATORY: Lung sounds - Crackles in bilateral lobes. +On BiPAP. CARDIOVASCULAR:     Heart Inspection- shows no noted pulsations  Heart Palpation- no heaves or thrills; PMI is non-displaced   Heart Ausculation- IRR, 2/6 DEBRA. No s3 or rub   PV: trace-1+ lower extremity edema. No varicosities. Pedal pulses palpable, no clubbing or cyanosis   ABDOMEN: Soft, non-tender to light palpation. Bowel sounds present. No palpable masses no organomegaly; no abdominal bruit  MS: Good muscle strength and tone. No atrophy or abnormal movements. : Aguirre to gravity with clear yellow urine (~500 cc in bag)   SKIN: Warm and dry no statis dermatitis or ulcers   NEURO / PSYCH: Oriented to person, place and time. Speech clear and appropriate. Follows all commands. Appears agitated. DATA:    Tele strips: Non-telemetry monitored unit. (DNR-CC)   Diagnostic:    Labs:   CBC:   Recent Labs     06/10/19  0451   WBC 13.9*   HGB 10.5*   HCT 33.4*        BMP:   Recent Labs     06/10/19  0451      K 3.2*   CO2 25   BUN 24*   CREATININE 1.3*   LABGLOM 38   CALCIUM 9.2     HgA1c:   Lab Results   Component Value Date    LABA1C 10.2 (H) 03/04/2019     No results found for: EAG  proBNP:   Recent Labs     06/10/19  0451   PROBNP 3,483*     PT/INR:   Recent Labs     06/10/19  0451   PROTIME 12.2   INR 1.1     CARDIAC ENZYMES:  Recent Labs     06/10/19  0451   TROPONINI 0.06*     FASTING LIPID PANEL:  Lab Results   Component Value Date    CHOL 159 03/04/2019    HDL 63 03/04/2019    LDLCALC 72 03/04/2019    TRIG 122 03/04/2019     CXR: 6/10/2019  Cardiomegaly and pulmonary vascular congestion. Suspect developing   airspace disease on the right possibly with adjacent layering   effusion.      Electronically signed by INDY Desouza CNP on 6/10/19 at 12:58 PM    Signed

## 2019-06-11 NOTE — PROGRESS NOTES
INPATIENT CARDIOLOGY FOLLOW-UP    Name: Neto Pimentel    Age: 80 y.o. Date of Admission: 6/10/2019  3:28 AM    Date of Service: 6/11/2019    Chief Complaint: Follow-up for acute superimposed upon chronic diastolic heart failure with associated hypoxic respiratory failure, permanent atrial fibrillation, hypertension, chronic kidney disease    Interim History: The patient presently relates no discomfort nor respiratory difficulties and has been weaned from BiPAP. Incomplete intake and output has been maintained limiting adequate assessment of her volume status. A slight increase of her serum creatinine is noted with present fluid mobilization. Review of Systems: The remainder of a complete multisystem review including consitutional, central nervous, respiratory, circulatory, gastrointestinal, genitourinary, endocrinologic, hematologic, musculoskeletal and psychiatric are negative. Problem List:  Patient Active Problem List   Diagnosis    Hypertension    Hypothyroidism    Acute on chronic combined systolic and diastolic CHF (congestive heart failure) ([facility] [address]    New onset atrial fibrillation (HCC)    Anticoagulation management encounter    Congestive heart failure ([facility] [address]    CAD (coronary artery disease)    Closed disp intertrochanteric fracture of right femur with malunion    Acute respiratory failure (HCC)    Hypoxia    Type 2 diabetes mellitus without complication, without long-term current use of insulin (HCC)    Benign essential HTN    Chronic atrial fibrillation (HCC)    Other specified hypothyroidism    Chronic kidney disease (CKD) stage G3a/A1, moderately decreased glomerular filtration rate (GFR) between 45-59 mL/min/1.73 square meter and albuminuria creatinine ratio less than 30 mg/g (HCC)    Chronic anticoagulation    Primary insomnia    Hernia of anterior abdominal wall    Anemia       Allergies:   Allergies   Allergen Reactions    Asa [Aspirin] Other (See Comments) 17.700 06/11/2019     No components found for: CHLPL  Lab Results   Component Value Date    TRIG 122 03/04/2019    TRIG 135 12/03/2018    TRIG 82 03/12/2017     Lab Results   Component Value Date    HDL 63 03/04/2019    HDL 87 12/03/2018    HDL 78 03/12/2017     Lab Results   Component Value Date    LDLCALC 72 03/04/2019    [address] 95 12/03/2018    LDLCALC 73 03/12/2017       Cardiac Tests:  Last Echocardiogram: An echocardiogram demonstrated evidence of a normal-sized left chamber with mild concentric left ventricular hypertrophy and left ventricular systolic function the lower limits normal cusp injection fraction of approximately 50%. Biatrial enlargement is present who live with thickening and mild mitral regurgitation. No additional significant valvular pathology is identified      ASSESSMENT / PLAN:  On a clinical basis, the patient has mildly stabilized albeit in the face of persistent volume overload with the ability to wean BiPAP with marginal oxygenation maintained on present settings. He need fluid mobilization will be essential to maintaining comfort with need of careful monitoring of renal function electrolytes. Acceptable rate control of her atrial fibrillation is maintained without additional planned alteration of her management. Based in part on her age and limited reserves, her prognosis remains guarded. Note: This report was completed utilizing computer voice recognition software. Every effort has been made to ensure accuracy, however; inadvertent computerized transcription errors may be present. Yazmin Duran.  Wilfredo Henning, formerly [facility]

## 2019-06-11 NOTE — PLAN OF CARE
Problem: Falls - Risk of:  Goal: Will remain free from falls  Description  Will remain free from falls  6/11/2019 0210 by Jl Watts RN  Outcome: Met This Shift    Goal: Absence of physical injury  Description  Absence of physical injury  6/11/2019 0210 by Jl Watts RN  Outcome: Met This Shift       Problem: Risk for Impaired Skin Integrity  Goal: Tissue integrity - skin and mucous membranes  Description  Structural intactness and normal physiological function of skin and  mucous membranes.   6/11/2019 0210 by Jl Watts RN  Outcome: Met This Shift       Problem: Breathing Pattern - Ineffective:  Goal: Ability to achieve and maintain a regular respiratory rate will improve  Description  Ability to achieve and maintain a regular respiratory rate will improve  6/11/2019 0210 by Jl Watts RN  Outcome: Met This Shift

## 2019-06-11 NOTE — PROGRESS NOTES
[facility] Quality Flow/Interdisciplinary Rounds Progress Note        Quality Flow Rounds held on June 11, 2019    Disciplines Attending:  Bedside Nurse, ,  and Nursing Unit Vick Goldsmith was admitted on 6/10/2019  3:28 AM    Anticipated Discharge Date:  Expected Discharge Date: 06/14/19    Disposition:    Brice Score:  Brice Scale Score: 18    Readmission Risk              Risk of Unplanned Readmission:        23           Discussed patient goal for the day, patient clinical progression, and barriers to discharge.   The following Goal(s) of the Day/Commitment(s) have been identified:  test result reviewed, discharge planning      Stephanie Green  June 11, 2019

## 2019-06-11 NOTE — PROGRESS NOTES
Skin is warm and dry. Capillary refill takes less than 2 seconds. No rash noted. She is not diaphoretic. No erythema. There is pallor. Psychiatric: She has a normal mood and affect. Very pleasant to speak with. Vitals reviewed. [place] Symptom AssessmentScore  [place] Score    Pain Score 0   TirednessScore 4   Nausea Score 0   Depression Score 0   AnxietyScore 0   Drowsiness Score 3   Anorexia Score (0= eating well, 10= not eating) 0   Wellbeing Score  (10= worst sense of well-being) 7 - due to her heart failure   Constipation    9   Dyspnea Score    (0= noshortness of breath) 5     Assessed by: patient and provider. FLACC Scale (For Pain Assessment of the Non-Verbal Patient)  N/A Patient is able to verbalize. Results/Verification of Data Review  Objective data reviewed: labs, images, records, medication use, vitals, chart. Data in Support of Terminal Illness:  N/A Palliative Patient. Electronically signed by Porsche Tran DO on 6/11/2019 at 4:45 PM      Thank you for allowing Palliative Medicine to participate in the care of Luis Burn. Note: This report was completed using [employer] voiced recognition software. Every effort has been made to ensure accuracy; however, inadvertent computerized transcription errors may be present.

## 2019-06-11 NOTE — PROGRESS NOTES
Wound care notified of consult via perfect serve.   Electronically signed by Good Sy RN on 6/10/2019 at 11:51 PM

## 2019-06-11 NOTE — FLOWSHEET NOTE
Inpatient Wound Care    Admit Date: 6/10/2019  3:28 AM    Reason for consult:  L foot    Significant history:  Admitted with respiratory failure. History includes: CHF and A-fib. Wound history: POA    Findings:      06/11/19 1133   Wound 06/10/19 Foot Anterior; Left   Date First Assessed/Time First Assessed: 06/10/19 1930   Primary Wound Type: Skin Tear  Location: Foot  Wound Location Orientation: Anterior; Left   Dressing Status Changed   Dressing Changed Changed/New   Dressing/Treatment Alginate;Non adherent   Wound Cleansed Rinsed/Irrigated with saline   Dressing Change Due 06/13/19   Wound Length (cm) 3 cm   Wound Width (cm) 3 cm   Wound Depth (cm) 0.2 cm   Wound Surface Area (cm^2) 9 cm^2   Change in Wound Size % (l*w) -50   Wound Volume (cm^3) 1.8 cm^3   Wound Assessment Red   Drainage Amount Small   Drainage Description Serous   Odor None   Brit-wound Assessment Edema;Fragile;Painful       Impression:  L dorsal foot wound , open blister. Heels and sacrum intact. Interventions in place:  Wound cleansed with NSS. Applied adaptic then opticell. Covered with ABD then wrapped with kerlix. Plan: QOD dressing changes.        Marni Dorsey 6/11/2019 11:34 AM

## 2019-06-12 NOTE — PROGRESS NOTES
INPATIENT CARDIOLOGY FOLLOW-UP    Name: Kashmir April    Age: 80 y.o. Date of Admission: 6/10/2019  3:28 AM    Date of Service: 6/12/2019    Chief Complaint: Follow-up for acute superimposed upon chronic diastolic heart failure, permanent atrial fibrillation, hypertension, chronic kidney disease    Interim History: The patient remains symptomatically improved with no significant complaints beyond that of insomnia. Continue fluid mobilization has occurred with reassessment of renal function electrolytes pending. Review of Systems: The remainder of a complete multisystem review including consitutional, central nervous, respiratory, circulatory, gastrointestinal, genitourinary, endocrinologic, hematologic, musculoskeletal and psychiatric are negative. Problem List:  Patient Active Problem List   Diagnosis    Hypertension    Hypothyroidism    Acute on chronic combined systolic and diastolic CHF (congestive heart failure) ([facility] [address]    New onset atrial fibrillation (HCC)    Anticoagulation management encounter    Congestive heart failure ([facility] [address]    CAD (coronary artery disease)    Closed disp intertrochanteric fracture of right femur with malunion    Acute respiratory failure (HCC)    Hypoxia    Type 2 diabetes mellitus without complication, without long-term current use of insulin (HCC)    Benign essential HTN    Chronic atrial fibrillation (HCC)    Other specified hypothyroidism    Chronic kidney disease (CKD) stage G3a/A1, moderately decreased glomerular filtration rate (GFR) between 45-59 mL/min/1.73 square meter and albuminuria creatinine ratio less than 30 mg/g (HCC)    Chronic anticoagulation    Primary insomnia    Hernia of anterior abdominal wall    Anemia    Goals of care, counseling/discussion    Palliative care encounter       Allergies:   Allergies   Allergen Reactions    Asa [Aspirin] Other (See Comments)      causesGI Bleeding per patient       Current Medications:  Current Facility-Administered Medications   Medication Dose Route Frequency Provider Last Rate Last Dose    sennosides-docusate sodium (SENOKOT-S) 8.6-50 MG tablet 2 tablet  2 tablet Oral Daily PRN Dick Fernandez, DO        sodium chloride flush 0.9 % injection 10 mL  10 mL Intravenous 2 times per day Tatiana Risen, DO   10 mL at 06/11/19 2202    sodium chloride flush 0.9 % injection 10 mL  10 mL Intravenous PRN Tatiana Risen, DO   10 mL at 06/10/19 1224    acetaminophen (TYLENOL) tablet 650 mg  650 mg Oral Q4H PRN Tatiana Risen, DO        ferrous sulfate tablet 325 mg  325 mg Oral BID  Loren Buccino, DO   325 mg at 06/11/19 1704    levothyroxine (SYNTHROID) tablet 100 mcg  100 mcg Oral Daily Loren Buccino, DO   100 mcg at 06/12/19 8015    pantoprazole (PROTONIX) tablet 40 mg  40 mg Oral QAM AC Loren Buccino, DO   40 mg at 06/12/19 0614    sucralfate (CARAFATE) tablet 1 g  1 g Oral 4 times per day Loren Buccino, DO   1 g at 06/12/19 0593    perflutren lipid microspheres (DEFINITY) injection 1.65 mg  1.5 mL Intravenous ONCE PRN Loren Buccino, DO        insulin lispro (HUMALOG) injection vial 0-6 Units  0-6 Units Subcutaneous TID  Loren Buccino, DO   1 Units at 06/11/19 1704    insulin lispro (HUMALOG) injection vial 0-3 Units  0-3 Units Subcutaneous Nightly Loren Buccino, DO   1 Units at 06/10/19 2017    potassium chloride (KLOR-CON M) extended release tablet 20 mEq  20 mEq Oral Daily with breakfast Loren Buccino, DO   20 mEq at 06/11/19 0837    enoxaparin (LOVENOX) injection 30 mg  30 mg Subcutaneous Daily Loren Buccino, DO   30 mg at 06/11/19 1150    furosemide (LASIX) injection 40 mg  40 mg Intravenous BID Jeni James MD   40 mg at 06/11/19 1704         Physical Exam:  /72   Pulse 65   Temp 98.3 °F (36.8 °C) (Oral)   Resp 20   Ht 5' 4\" (1.626 m)   Wt 142 lb 6 oz (64.6 kg)   SpO2 96%   BMI 24.44 kg/m²   Weight change: 0 lb (0 kg)  Wt Readings from Last 3 Encounters:   06/12/19

## 2019-06-12 NOTE — PROGRESS NOTES
Physical Therapy    Facility/Department: [address] MED SURG  Treatment Note  NAME: Brisa Casiano  : 1925  MRN: [id number]    Date of Service: 2019    Evaluating Therapist: Sheri Rivera. John Vargas P.T. Room #: 9541/2447-V  DIAGNOSIS: Acute on Chronic respiratory failure  PRECAUTIONS: Falls, continuous pulse ox    Social:  Pt lives alone in a 1 floor plan no steps and no rails to enter. Prior to admission pt walked with ww     Initial Evaluation  Date: 19 Treatment      Short Term/ Long Term   Goals   Was pt agreeable to Eval/treatment? yes yes    Does pt have pain? No c/o pain No c/o pain    Bed Mobility  Rolling: MIN A  Supine to sit: MOD A  Sit to supine: NA  Scooting: MOD A Rolling: MIN A  Supine to sit: MIN A  Sit to supine: NA  Scooting: MOD A SBA   Transfers Sit to stand: MIN A  Stand to sit: MIN A  Stand pivot: MIN A with ww Sit to stand: MIN A  Stand to sit: MIN A  Stand pivot: MIN A with ww SBA   Ambulation    3 feet with ww with MIN A 3 feet to chair+3 feet forward and backward 3x with ww MIN A 50 feet with ww with SBA   Stair negotiation: ascended and descended NA NA NA   AM-PAC Raw Score  15/24 16      BLE ROM is WFL. BLE strength is grossly 4-/5 to 4/5. Balance: sitting EOB supervision and standing with ww SBA    Pt performed therapeutic exercise of the following: sat EOB x 5 min working on core strength and breathing technique. Pt performed sit<>stands x3 reps, stood with ww 2x1 minutes and 1x2 minutes    Patient education  Pt was educated on breathing technique. Patient response to education:   Pt verbalized understanding Pt demonstrated skill Pt requires further education in this area   yes yes yes     Additional Comments: Pt was found on 6L O2 and continuous pulse ox was at 91-93% lying in bed. Pt transferred to chair and while sitting in chair pulse ox was 95-97%. Pt sat EOB first, then walked 3 feet to chair and pulse ox was good and pt had no SOB.   After sitting in

## 2019-06-13 NOTE — PROGRESS NOTES
Admit Date: 6/10/2019      Subjective:     Patient: remains on IV lasix; still with mild SOB  Medication side effects: none    Scheduled Meds:   hydrALAZINE  10 mg Oral 2 times per day    sodium chloride flush  10 mL Intravenous 2 times per day    ferrous sulfate  325 mg Oral BID WC    levothyroxine  100 mcg Oral Daily    pantoprazole  40 mg Oral QAM AC    sucralfate  1 g Oral 4 times per day    insulin lispro  0-6 Units Subcutaneous TID WC    insulin lispro  0-3 Units Subcutaneous Nightly    potassium chloride  20 mEq Oral Daily with breakfast    enoxaparin  30 mg Subcutaneous Daily    furosemide  40 mg Intravenous BID     Continuous Infusions:  PRN Meds:sennosides-docusate sodium, sodium chloride flush, acetaminophen, perflutren lipid microspheres    Objective:   I/O last 3 completed shifts: In: 900 [P.O.:900]  Out: 2550 [Urine:2550]  No intake/output data recorded.     BP (!) 157/86   Pulse 64   Temp 97.8 °F (36.6 °C) (Oral)   Resp 24   Ht 5' 4\" (1.626 m)   Wt 133 lb 11.2 oz (60.6 kg)   SpO2 94%   BMI 22.95 kg/m²   General appearance: alert, appears stated age and cooperative  Skin:negative  Heent:negative  Lungs: diminished breath sounds bibasilar  Heart: regular rate and rhythm, S1, S2 normal, no murmur, click, rub or gallop  Abdomen: soft, non-tender; bowel sounds normal; no masses,  no organomegaly  Extremities:no edema  Neurologic: Grossly normal    Labs:  CBC with Differential:    Lab Results   Component Value Date    WBC 15.8 06/11/2019    RBC 3.06 06/11/2019    HGB 9.2 06/11/2019    HCT 28.7 06/11/2019     06/11/2019    MCV 93.8 06/11/2019    MCH 30.1 06/11/2019    MCHC 32.1 06/11/2019    RDW 14.5 06/11/2019    NRBC 0.0 03/12/2017    SEGSPCT 80 02/21/2014    METASPCT 1 02/17/2014    LYMPHOPCT 2.6 06/10/2019    PROMYELOPCT 1.7 03/12/2017    MONOPCT 3.2 06/10/2019    BASOPCT 0.4 06/10/2019    MONOSABS 0.44 06/10/2019    LYMPHSABS 0.36 06/10/2019    EOSABS 0.16 06/10/2019    BASOSABS

## 2019-06-13 NOTE — PROGRESS NOTES
Occupational Therapy  OT BEDSIDE TREATMENT NOTE      Date:2019  Patient Name: Mile Guerra  MRN: [id number]  : 1925  Room: [address] [place], [place] [address]        Evaluating OT: Mj Barber OTR/L [id number]     AM-PAC Daily Activity Raw Score:      Recommended Adaptive Equipment: TBD      Comments: Based on patient's functional performance as documented below and prior level of function, patient would benefit from continued skilled OT during/following hospital stay in an effort to increase functional safety, independence with ADLS/IADLS, and overall quality of life     DIAGNOSIS: Acute on Chronic respiratory failure  PMHX: R ORIF 3/2018, anemia, cerbral infarct  PRECAUTIONS: Falls, O2, continuous pulse ox, [place]      Social:  Admitted from [facility]   Prior:  Patient lives alone in a 1 story  no steps and no rails to enter. Grandson and family live close by and check on her daily. She was Independent and  walked with ww     Pain Level: no pain   Cognition:  Oriented, alert and conversing                         Judgement/safety:  fair                Functional Assessment:    Initial Eval Status  Date: 19 Tx Session   Date: 19 Short Term Goals  Treatment frequency: PRN   Feeding Set-up    Independent    Grooming SBA/set-up,seated    Mod I    UB Dressing MiN A   Mod I    LB Dressing Max A  Min A seated to manage B socks  Mod I    Bathing         Toileting         Bed Mobility  Min A  Supine to sit        Functional Transfers Min A  Sit-stand from bed  STS: Min A from arm chair 2xs  Mod I    Functional Mobility Min A,w/walker   Steps from bed to chair  Min A using ww limited distance in room  Mod I with good tolerance    Balance Sitting:     Static:  Independent    Standing: Min A  Sitting: Independent  Standing: Min A     Activity Tolerance Fair  Fair. SpO2 ranged between 84%-96% during session.   SpO2 desaturates upon standing but recovers with brief seated rest break Good with ADL activity            B UE were [facility]

## 2019-06-14 PROBLEM — I50.43 CHF (CONGESTIVE HEART FAILURE), NYHA CLASS I, ACUTE ON CHRONIC, COMBINED (HCC): Status: ACTIVE | Noted: 2019-01-01

## 2019-06-14 NOTE — CARE COORDINATION
Social Work discharge planning   Pt to [facility] skilled today at 5:15p via [employer];'s Nadia Pulido and they will bring 02 per Afshan Delgadillo. CHarge RN Florentin Wylie, pt's RN Keerthi Fitzpatrick, pt aware. Per pt's request, Sw called her grandson Brit Martines at [phone] and lvm for him re: abive info.   Also per pt's request, Sw called and spoke to Laurita London at [phone] re:  at [address] [place] [address] or return to snf  Electronically signed by Shelby Ferrell on 6/14/2019 at 1:39 PM

## 2019-06-14 NOTE — PROGRESS NOTES
Exam:  BP (!) 143/66   Pulse 72   Temp 97.8 °F (36.6 °C) (Oral)   Resp 20   Ht 5' 4\" (1.626 m)   Wt 129 lb (58.5 kg)   SpO2 95%   BMI 22.14 kg/m²   Weight change: -4 lb 11.2 oz (-2.132 kg)  Wt Readings from Last 3 Encounters:   06/14/19 129 lb (58.5 kg)   05/30/19 141 lb (64 kg)   05/22/19 117 lb 7 oz (53.3 kg)     The patient is awake, alert and in no discomfort or distress. No gross musculoskeletal deformity is present. No significant skin or nail changes are present. Gross examination of head, eyes, nose and throat are negative. Jugular venous pressure is normal and no carotid bruits are present. Normal respiratory effort is noted with no accessory muscle usage present. Lung fields are clear to ascultation diminished breath sounds at both lung bases. Cardiac examination is notable for an iregular rhythm with no palpable thrill. No gallop rhythm or cardiac murmur are identified. A benign abdominal examination is present with no masses or organomegaly. Intact pulses are present throughout all extremities and no peripheral edema is present. No focal neurologic deficits are present. Intake/Output:    Intake/Output Summary (Last 24 hours) at 6/14/2019 0643  Last data filed at 6/14/2019 0326  Gross per 24 hour   Intake 960 ml   Output 2200 ml   Net -1240 ml     I/O this shift:  In: -   Out: 750 [Urine:750]    Laboratory Tests:  Lab Results   Component Value Date    CREATININE 1.4 (H) 06/14/2019    BUN 25 (H) 06/14/2019     06/14/2019    K 3.0 (L) 06/14/2019    CL 95 (L) 06/14/2019    CO2 28 06/14/2019     No results for input(s): CKTOTAL, CKMB in the last 72 hours.     Invalid input(s): TROPONONI  No results found for: BNP  Lab Results   Component Value Date    WBC 15.8 06/11/2019    RBC 3.06 06/11/2019    HGB 9.2 06/11/2019    HCT 28.7 06/11/2019    MCV 93.8 06/11/2019    MCH 30.1 06/11/2019    MCHC 32.1 06/11/2019    RDW 14.5 06/11/2019     06/11/2019    MPV 10.7 06/11/2019     No results for

## 2019-06-14 NOTE — PLAN OF CARE
Problem: Breathing Pattern - Ineffective:  Goal: Ability to achieve and maintain a regular respiratory rate will improve  Description  Ability to achieve and maintain a regular respiratory rate will improve  6/14/2019 0030 by Anne Alanis RN  Outcome: Ongoing  6/13/2019 1157 by Derian Loya RN  Outcome: Met This Shift     Problem: Gas Exchange - Impaired:  Goal: Levels of oxygenation will improve  Description  Levels of oxygenation will improve  Outcome: Met This Shift  Note:   Patient O2 decreased to 4L hi-yuliana and tolerated.

## 2019-06-14 NOTE — PROGRESS NOTES
BASOSABS 0.05 06/10/2019     BMP:    Lab Results   Component Value Date     06/14/2019    K 3.0 06/14/2019    K 3.9 05/04/2018    CL 95 06/14/2019    CO2 28 06/14/2019    BUN 25 06/14/2019    LABALBU 2.4 06/11/2019    CREATININE 1.4 06/14/2019    CALCIUM 8.7 06/14/2019    GFRAA 42 06/14/2019    LABGLOM 35 06/14/2019     PT/INR:    Lab Results   Component Value Date    PROTIME 12.2 06/10/2019    INR 1.1 06/10/2019     Last 3 Troponin:    Lab Results   Component Value Date    TROPONINI 0.06 06/10/2019    TROPONINI 0.07 06/10/2019    TROPONINI 0.06 06/10/2019     TSH:    Lab Results   Component Value Date    TSH 17.700 06/11/2019      Assessment:     1. Acute hypoxic respiratory failure  2. Acute on chronic diastolic HF  3.  DNRcc status    Plan:       Continue same plan and orders    DC back to SNF when medically stable  Continue with diuretics as per Cardiology

## 2019-06-24 NOTE — PROGRESS NOTES
[facility] PHYSICIAN CARDIOLOGY   OFFICE VISIT        PRIMARY CARE PHYSICIAN:      Loren Bear DO         ALLERGIES / SENSITIVITIES:        Allergies   Allergen Reactions    Asa [Aspirin] Other (See Comments)      causesGI Bleeding per patient          REVIEWED MEDICATIONS:       Current Outpatient Medications   Medication Sig Dispense Refill    hydrALAZINE (APRESOLINE) 10 MG tablet Take 1 tablet by mouth every 12 hours 90 tablet 3    torsemide (DEMADEX) 20 MG tablet Take 1 tablet by mouth daily 30 tablet 3    Bromfenac Sodium (BROMSITE) 0.075 % SOLN Place 1 drop into the left eye 3 times daily      bisacodyl (DULCOLAX) 10 MG suppository Place 10 mg rectally daily as needed for Constipation      magnesium hydroxide (MILK OF MAGNESIA) 400 MG/5ML suspension Take 30 mLs by mouth daily as needed for Constipation      pantoprazole (PROTONIX) 40 MG tablet Take 1 tablet by mouth every morning (before breakfast) 30 tablet 3    sucralfate (CARAFATE) 1 GM tablet Take 1 tablet by mouth 4 times daily 120 tablet 3    linagliptin (TRADJENTA) 5 MG tablet Take 5 mg by mouth every morning      levothyroxine (SYNTHROID) 100 MCG tablet Take 1 tablet by mouth Daily (Patient taking differently: Take 100 mcg by mouth every morning ) 30 tablet 3    isosorbide mononitrate (IMDUR) 60 MG extended release tablet Take 1 tablet by mouth daily (Patient taking differently: Take 60 mg by mouth every morning ) 30 tablet 2    polyethyl glycol-propyl glycol 0.4-0.3 % (SYSTANE) 0.4-0.3 % ophthalmic solution Place 1 drop into the left eye as needed for Dry Eyes       Multiple Vitamins-Minerals (OCUVITE EYE HEALTH FORMULA PO) Take 1 capsule by mouth 2 times daily       traMADol (ULTRAM) 50 MG tablet Take 50 mg by mouth every 6 hours as needed for Pain. [facility] Lung ferrous sulfate 325 (65 FE) MG tablet Take 325 mg by mouth 2 times daily      OXYGEN Inhale 4 L into the lungs continuous        No current facility-administered medications for this visit. S: REASON FOR VISIT:     Heart failure atrial fibrillation. She is followed by  in [place] [facility] but is being seen today as a CHF follow-up at [facility]. She resides in a facility and is here today with a friend and she does have a power of . Admitted to [facility] in June with dyspnea. And hypoxia requiring BiPAP. proBNP was 3480 and BUN and creatinine were 24 and 1.3. She received IV diuresis. She is here today for follow-up. She is not a reliable historian. She can ambulate with a walker and has no dyspnea with exertion, chest pain, orthopnea or swelling of the lower extremities. REVIEW OF SYSTEMS:       Constitutional: no fevers or chills or fatigue   HEENT: denies changes in hearing or vision, No difficulty swallowing   Endocrine: no weight changes   Musculoskeletal: no arthralgias or myalgias   Skin: denies rashes or itching or lesions   Heme/Lymph: denies bleeding   Heart: as above   Lungs: as above   GI: denies abdominal pain, nausea, vomiting, diarrhea, rectal bleeding, tarry stools   : denies hematuria, dysuria   Psych: denies mood change, anxiety, depression. Neuro: denies numbness, tingling, tremors. CARDIOVASCULAR HISTORY:       1. Lifelong non smoker. 2. Hypothyroidism on replacement therapy. 3. HTN. 4. Hx T2 DM. 5. R TKA. 6. Upper endoscopy and colonoscopy 2014: Normal.  7. Anemia on iron supplementation. [address] [place] [address] [address] Admission, 03/11/2017 with dyspnea, orthopnea and cough.  BP: 203/99.  CXR showed CHF. Pro BNP 36064, troponin 0.02.  EKG showed sinus with nonspecific ST-T changes.  Improved with 40 of Lasix IV in ER.  New onset AF. 9. Echocardiogram, 03/12/2017, moderate concentric LVH, EF 45-55%. Diffuse hypokinesis.  Elevated LA pressure.  Mild MR.  No pericardial effusion. Mild AI.    10. Elevated CVA risk ([id number] score = 6).  Discharged on apixaban, 03/2017.   11.  Renal insufficiency. BUN 40. Creatinine 1.5, 03/14/2017. 12. [facility] for abnormal labs 8/9/2017-8/12/2017: H/H 6.3/21.3, Plt 546, WBC 10.3, Bun/Cr 32/1.9. EKG SR. /60 afebrile. Admitted with GIB with a consult to surgery, patient refused endoscopy. Given IV PPI and transfused 2 units PRBC. [address] held during admission but re-ordered upon discharge. H/H 9.1/28.9, Bu/Cr  21/0.8 8/12/2017 .  13. [facility] 3/2/2018-3/6/2018 Fall>>laid on basement floor for 14 hours. 14. R hip fracture s/p IM nail R hip 3/3/2018. 15. Admitted 05/03/18 with dyspnea. ProBNP 2238. diuresed with improvement. 16. LabsBUN =21. Creatinine= 1.2 K=3.9. 17. Admitted on 5/17/2019 with complaints of Bilateral knee pain. She had bilateral cataract surgery 2 days PTA. +FOBT. EGD (5/18/2019) showing Hiatal Hernia associated with gastritis. On discharge Eliquis ws not resumed. She was started on PPI. 18. Presented to [facility] 5/30/2019 with complaints of Epigastric CP with radiating pain to the left shoulder, lasting 30 minutes with worsening SOB. Patient wanted to be discharged with no admission. Code status changed to [facility]. 23. CHF admission Ginger 10, 2019  20. 2D echo Ginger 10, 2019 mild left ventricular concentric hypertrophy left atrium severely dilated mild mitral regurgitation and mildly enlarged right atrium mild thickening of the mitral valve leaflets and aortic valve sclerotic and mild aortic regurgitation cuspid regurgitation EF 50%                 O:  COMPLETE PHYSICAL EXAM:       BP (!) 102/58   Pulse 67   Resp 12   Ht 5' (1.524 m)   Wt 122 lb (55.3 kg)   BMI 23.83 kg/m²       General:   in no acute distress. Well-nourished well-developed   Skin                  Warm and dry, no rashes   Head & Neck:  No JVD. No carotid bruits. Mucous membranes moist.   Chest:   No deformities. Symmetrical and nontender. No respiratory distress   Lungs:   Clear to auscultation bilaterally. Heart:  Irregularly irregular normal S1 and S2. No S3 or S4.  No

## 2019-06-27 NOTE — TELEPHONE ENCOUNTER
Pt. Seen a few days ago, has chronic a fib and family friend was unaware that [address] had been stopped. She requested that I unform her POA, Luis A.  I left message and he is to call with any questions

## 2019-06-27 NOTE — DISCHARGE SUMMARY
subacute rehab,  and she was discharged on 05/22 to a local skilled nursing facility. PHYSICIANS FOLLOWING THE PATIENT DURING THIS HOSPITAL STAY:  General  Surgery. CONDITION ON DISCHARGE:  Level of function is fair to good. UNREPORTED TEST RESULTS AND INTENDED FOLLOWUP:  Nonapplicable. LEVEL OF ACTIVITY:  To be determined by Physical Therapy at the nursing  home. DISCHARGE DIET:  Includes soft diet as tolerated. DISCHARGE MEDICATIONS:  See discharge med reconciliation form. FOLLOWUP:  Followup will be in my office upon her discharge from the  nursing home.         Akin Jiménez DO    D: 06/26/2019 94:[id number]       T: 06/26/2019 20:53:15     [email or web address]  Job#: [id number]     Doc#: [id number]    CC:

## 2019-09-01 NOTE — ED NOTES
Assumed care of patient. Pt lying in bed in no apparent distress. no visitors at bedside. Waiting for [facility] for transer.       Georgi Thomas RN  05/30/19 1919 02-Sep-2019 02:30

## (undated) DEVICE — BLOCK BITE 60FR RUBBER ADLT DENTAL

## (undated) DEVICE — SPONGE GZ W4XL4IN RAYON POLY FILL CVR W/ NONWOVEN FAB

## (undated) DEVICE — GRADUATE TRIANG MEASURE 1000ML BLK PRNT